# Patient Record
Sex: FEMALE | Race: ASIAN | NOT HISPANIC OR LATINO | URBAN - METROPOLITAN AREA
[De-identification: names, ages, dates, MRNs, and addresses within clinical notes are randomized per-mention and may not be internally consistent; named-entity substitution may affect disease eponyms.]

---

## 2017-03-31 ENCOUNTER — EMERGENCY (EMERGENCY)
Facility: HOSPITAL | Age: 27
LOS: 1 days | Discharge: HOME | End: 2017-03-31
Attending: EMERGENCY MEDICINE | Admitting: EMERGENCY MEDICINE
Payer: COMMERCIAL

## 2017-03-31 VITALS
OXYGEN SATURATION: 100 % | HEART RATE: 85 BPM | TEMPERATURE: 98 F | SYSTOLIC BLOOD PRESSURE: 127 MMHG | DIASTOLIC BLOOD PRESSURE: 81 MMHG | RESPIRATION RATE: 16 BRPM

## 2017-03-31 PROCEDURE — 99283 EMERGENCY DEPT VISIT LOW MDM: CPT

## 2017-03-31 RX ORDER — ACETAMINOPHEN 500 MG
650 TABLET ORAL ONCE
Qty: 0 | Refills: 0 | Status: COMPLETED | OUTPATIENT
Start: 2017-03-31 | End: 2017-03-31

## 2017-03-31 RX ORDER — IBUPROFEN 200 MG
1 TABLET ORAL
Qty: 15 | Refills: 0 | OUTPATIENT
Start: 2017-03-31 | End: 2017-04-05

## 2017-03-31 RX ORDER — CYCLOBENZAPRINE HYDROCHLORIDE 10 MG/1
1 TABLET, FILM COATED ORAL
Qty: 9 | Refills: 0 | OUTPATIENT
Start: 2017-03-31 | End: 2017-04-03

## 2017-03-31 RX ORDER — KETOROLAC TROMETHAMINE 30 MG/ML
30 SYRINGE (ML) INJECTION ONCE
Qty: 0 | Refills: 0 | Status: DISCONTINUED | OUTPATIENT
Start: 2017-03-31 | End: 2017-03-31

## 2017-03-31 RX ADMIN — Medication 650 MILLIGRAM(S): at 12:18

## 2017-03-31 NOTE — ED ADULT TRIAGE NOTE - CHIEF COMPLAINT QUOTE
c/o lower back pain with radiation to B/L thighs on and off x 1 week, patient states she fell 3 weeks ago on ice but never sought treatment.  Patient went to see PMD yesterday and was given referral for MRI, and was prescribed medication but did not  med.  PMH: spontaneous   2016

## 2017-03-31 NOTE — ED PROVIDER NOTE - PROGRESS NOTE DETAILS
REYNALDO Castaneda:  Pt endorses improvement in pain.  Ambulatory without assistance.  Pt medically stable for discharge.  Pt to follow up with PMD and orthopedics (referral list provided).

## 2017-03-31 NOTE — ED PROVIDER NOTE - CHPI ED SYMPTOMS NEG
Denies weakness, difficulty ambulating, h/o similar pain, dysuria, fevers, numbness/tingling, urinary incontinence, and other complaints/no bowel dysfunction/no numbness/no bladder dysfunction/no tingling

## 2017-03-31 NOTE — ED PROVIDER NOTE - PLAN OF CARE
Rest, drink plenty of fluids.  Advance activity as tolerated.  Continue all previously prescribed medications as directed.  Take Motrin 600 mg every 8 hours as needed for moderate pain -- take with food. Take Tylenol 650mg (Two 325 mg pills) every 4-6 hours as needed for pain. Take Flexeril 5 mg every 8 hours as needed for muscle spasm -- causes drowsiness; no drinking alcohol or driving with this medication. Apply warm compress to affected area for 15 minutes, 3-4 times per day. Follow up with your primary care physician and orthopedics (referral list provided) in 48-72 hours- bring copies of your results.  Return to the ER for worsening or persistent symptoms, and/or ANY NEW OR CONCERNING SYMPTOMS. If you have issues obtaining follow up, please call: [phone] to obtain a doctor or specialist who takes your insurance in your area.

## 2017-03-31 NOTE — ED PROVIDER NOTE - CARE PLAN
Principal Discharge DX:	Back pain  Instructions for follow-up, activity and diet:	Rest, drink plenty of fluids.  Advance activity as tolerated.  Continue all previously prescribed medications as directed.  Take Motrin 600 mg every 8 hours as needed for moderate pain -- take with food. Take Tylenol 650mg (Two 325 mg pills) every 4-6 hours as needed for pain. Take Flexeril 5 mg every 8 hours as needed for muscle spasm -- causes drowsiness; no drinking alcohol or driving with this medication. Apply warm compress to affected area for 15 minutes, 3-4 times per day. Follow up with your primary care physician and orthopedics (referral list provided) in 48-72 hours- bring copies of your results.  Return to the ER for worsening or persistent symptoms, and/or ANY NEW OR CONCERNING SYMPTOMS. If you have issues obtaining follow up, please call: [phone] to obtain a doctor or specialist who takes your insurance in your area. Principal Discharge DX:	Back pain  Instructions for follow-up, activity and diet:	Rest, drink plenty of fluids.  Advance activity as tolerated.  Continue all previously prescribed medications as directed.  Take Motrin 600 mg every 8 hours as needed for moderate pain -- take with food. Take Tylenol 650mg (Two 325 mg pills) every 4-6 hours as needed for pain. Take Flexeril 5 mg every 8 hours as needed for muscle spasm -- causes drowsiness; no drinking alcohol or driving with this medication. Apply warm compress to affected area for 15 minutes, 3-4 times per day. Follow up with your primary care physician and orthopedics (referral list provided) in 48-72 hours- bring copies of your results.  Return to the ER for worsening or persistent symptoms, and/or ANY NEW OR CONCERNING SYMPTOMS. If you have issues obtaining follow up, please call: [phone] to obtain a doctor or specialist who takes your insurance in your area.

## 2017-03-31 NOTE — ED PROVIDER NOTE - OBJECTIVE STATEMENT
25 y/o F, w/ no sig PMHx, presents to ED c/o x1wk of low back pain. Reports pain w/ movement. Took Motrin around 6:30AM today. Denies weakness, difficulty ambulating, h/o similar pain, dysuria, fevers, numbness/tingling, urinary incontinence, heavy lifting, and other complaints. Reports a minor fall a few wks ago due to slipping on snow/ice. Pt unsure of pregnancy status.

## 2017-03-31 NOTE — ED PROVIDER NOTE - NS ED MD SCRIBE ATTENDING SCRIBE SECTIONS
VITAL SIGNS( Pullset)/DISPOSITION/HISTORY OF PRESENT ILLNESS/PAST MEDICAL/SURGICAL/SOCIAL HISTORY/REVIEW OF SYSTEMS/HIV/PHYSICAL EXAM

## 2018-01-15 ENCOUNTER — INPATIENT (INPATIENT)
Facility: HOSPITAL | Age: 28
LOS: 2 days | Discharge: HOME | End: 2018-01-18
Attending: OBSTETRICS & GYNECOLOGY | Admitting: OBSTETRICS & GYNECOLOGY
Payer: MEDICAID

## 2018-01-15 ENCOUNTER — TRANSCRIPTION ENCOUNTER (OUTPATIENT)
Age: 28
End: 2018-01-15

## 2018-01-15 ENCOUNTER — EMERGENCY (EMERGENCY)
Facility: HOSPITAL | Age: 28
LOS: 1 days | Discharge: STILL ADMITTED OR ADMINISTRATIVE | End: 2018-01-15
Admitting: EMERGENCY MEDICINE

## 2018-01-15 VITALS
TEMPERATURE: 98 F | SYSTOLIC BLOOD PRESSURE: 129 MMHG | RESPIRATION RATE: 20 BRPM | DIASTOLIC BLOOD PRESSURE: 75 MMHG | HEART RATE: 112 BPM | OXYGEN SATURATION: 100 %

## 2018-01-15 VITALS — HEIGHT: 65 IN | WEIGHT: 198.42 LBS

## 2018-01-15 DIAGNOSIS — O26.899 OTHER SPECIFIED PREGNANCY RELATED CONDITIONS, UNSPECIFIED TRIMESTER: ICD-10-CM

## 2018-01-15 LAB
AMPHET UR-MCNC: NEGATIVE — SIGNIFICANT CHANGE UP
BARBITURATES UR SCN-MCNC: NEGATIVE — SIGNIFICANT CHANGE UP
BASOPHILS # BLD AUTO: 0.03 K/UL — SIGNIFICANT CHANGE UP (ref 0–0.2)
BASOPHILS NFR BLD AUTO: 0.3 % — SIGNIFICANT CHANGE UP (ref 0–2)
BENZODIAZ UR-MCNC: NEGATIVE — SIGNIFICANT CHANGE UP
BLD GP AB SCN SERPL QL: NEGATIVE — SIGNIFICANT CHANGE UP
CANNABINOIDS UR-MCNC: NEGATIVE — SIGNIFICANT CHANGE UP
COCAINE METAB.OTHER UR-MCNC: NEGATIVE — SIGNIFICANT CHANGE UP
EOSINOPHIL # BLD AUTO: 0.06 K/UL — SIGNIFICANT CHANGE UP (ref 0–0.5)
EOSINOPHIL NFR BLD AUTO: 0.5 % — SIGNIFICANT CHANGE UP (ref 0–6)
HCT VFR BLD CALC: 36.8 % — SIGNIFICANT CHANGE UP (ref 34.5–45)
HGB BLD-MCNC: 12.2 G/DL — SIGNIFICANT CHANGE UP (ref 11.5–15.5)
IMM GRANULOCYTES # BLD AUTO: 0.05 # — SIGNIFICANT CHANGE UP
IMM GRANULOCYTES NFR BLD AUTO: 0.5 % — SIGNIFICANT CHANGE UP (ref 0–1.5)
LYMPHOCYTES # BLD AUTO: 1.94 K/UL — SIGNIFICANT CHANGE UP (ref 1–3.3)
LYMPHOCYTES # BLD AUTO: 17.7 % — SIGNIFICANT CHANGE UP (ref 13–44)
MCHC RBC-ENTMCNC: 29.1 PG — SIGNIFICANT CHANGE UP (ref 27–34)
MCHC RBC-ENTMCNC: 33.2 % — SIGNIFICANT CHANGE UP (ref 32–36)
MCV RBC AUTO: 87.8 FL — SIGNIFICANT CHANGE UP (ref 80–100)
METHADONE UR-MCNC: NEGATIVE — SIGNIFICANT CHANGE UP
MONOCYTES # BLD AUTO: 0.59 K/UL — SIGNIFICANT CHANGE UP (ref 0–0.9)
MONOCYTES NFR BLD AUTO: 5.4 % — SIGNIFICANT CHANGE UP (ref 2–14)
NEUTROPHILS # BLD AUTO: 8.3 K/UL — HIGH (ref 1.8–7.4)
NEUTROPHILS NFR BLD AUTO: 75.6 % — SIGNIFICANT CHANGE UP (ref 43–77)
NRBC # FLD: 0 — SIGNIFICANT CHANGE UP
OPIATES UR-MCNC: NEGATIVE — SIGNIFICANT CHANGE UP
OXYCODONE UR-MCNC: NEGATIVE — SIGNIFICANT CHANGE UP
PCP UR-MCNC: NEGATIVE — SIGNIFICANT CHANGE UP
PLATELET # BLD AUTO: 309 K/UL — SIGNIFICANT CHANGE UP (ref 150–400)
PMV BLD: 10.2 FL — SIGNIFICANT CHANGE UP (ref 7–13)
RBC # BLD: 4.19 M/UL — SIGNIFICANT CHANGE UP (ref 3.8–5.2)
RBC # FLD: 13.4 % — SIGNIFICANT CHANGE UP (ref 10.3–14.5)
RH IG SCN BLD-IMP: POSITIVE — SIGNIFICANT CHANGE UP
RH IG SCN BLD-IMP: POSITIVE — SIGNIFICANT CHANGE UP
RUBV IGG SER-ACNC: 0.5 INDEX — SIGNIFICANT CHANGE UP
RUBV IGG SER-IMP: NEGATIVE — SIGNIFICANT CHANGE UP
WBC # BLD: 10.97 K/UL — HIGH (ref 3.8–10.5)
WBC # FLD AUTO: 10.97 K/UL — HIGH (ref 3.8–10.5)

## 2018-01-15 PROCEDURE — 59514 CESAREAN DELIVERY ONLY: CPT | Mod: U9,GC

## 2018-01-15 RX ORDER — OXYCODONE HYDROCHLORIDE 5 MG/1
10 TABLET ORAL
Qty: 0 | Refills: 0 | Status: DISCONTINUED | OUTPATIENT
Start: 2018-01-16 | End: 2018-01-16

## 2018-01-15 RX ORDER — GLYCERIN ADULT
1 SUPPOSITORY, RECTAL RECTAL AT BEDTIME
Qty: 0 | Refills: 0 | Status: DISCONTINUED | OUTPATIENT
Start: 2018-01-15 | End: 2018-01-18

## 2018-01-15 RX ORDER — ONDANSETRON 8 MG/1
4 TABLET, FILM COATED ORAL EVERY 6 HOURS
Qty: 0 | Refills: 0 | Status: DISCONTINUED | OUTPATIENT
Start: 2018-01-15 | End: 2018-01-16

## 2018-01-15 RX ORDER — HYDROMORPHONE HYDROCHLORIDE 2 MG/ML
0.5 INJECTION INTRAMUSCULAR; INTRAVENOUS; SUBCUTANEOUS
Qty: 0 | Refills: 0 | Status: DISCONTINUED | OUTPATIENT
Start: 2018-01-16 | End: 2018-01-16

## 2018-01-15 RX ORDER — NALOXONE HYDROCHLORIDE 4 MG/.1ML
0.1 SPRAY NASAL
Qty: 0 | Refills: 0 | Status: DISCONTINUED | OUTPATIENT
Start: 2018-01-16 | End: 2018-01-16

## 2018-01-15 RX ORDER — DIPHENHYDRAMINE HCL 50 MG
25 CAPSULE ORAL EVERY 4 HOURS
Qty: 0 | Refills: 0 | Status: DISCONTINUED | OUTPATIENT
Start: 2018-01-15 | End: 2018-01-16

## 2018-01-15 RX ORDER — HEPARIN SODIUM 5000 [USP'U]/ML
5000 INJECTION INTRAVENOUS; SUBCUTANEOUS EVERY 12 HOURS
Qty: 0 | Refills: 0 | Status: DISCONTINUED | OUTPATIENT
Start: 2018-01-15 | End: 2018-01-18

## 2018-01-15 RX ORDER — SODIUM CHLORIDE 9 MG/ML
1000 INJECTION, SOLUTION INTRAVENOUS
Qty: 0 | Refills: 0 | Status: DISCONTINUED | OUTPATIENT
Start: 2018-01-15 | End: 2018-01-16

## 2018-01-15 RX ORDER — KETOROLAC TROMETHAMINE 30 MG/ML
30 SYRINGE (ML) INJECTION EVERY 6 HOURS
Qty: 0 | Refills: 0 | Status: DISCONTINUED | OUTPATIENT
Start: 2018-01-15 | End: 2018-01-16

## 2018-01-15 RX ORDER — OXYTOCIN 10 UNIT/ML
333.33 VIAL (ML) INJECTION
Qty: 20 | Refills: 0 | Status: DISCONTINUED | OUTPATIENT
Start: 2018-01-15 | End: 2018-01-15

## 2018-01-15 RX ORDER — FERROUS SULFATE 325(65) MG
325 TABLET ORAL
Qty: 0 | Refills: 0 | Status: DISCONTINUED | OUTPATIENT
Start: 2018-01-15 | End: 2018-01-18

## 2018-01-15 RX ORDER — SODIUM CHLORIDE 9 MG/ML
1000 INJECTION, SOLUTION INTRAVENOUS
Qty: 0 | Refills: 0 | Status: DISCONTINUED | OUTPATIENT
Start: 2018-01-15 | End: 2018-01-15

## 2018-01-15 RX ORDER — HYDROMORPHONE HYDROCHLORIDE 2 MG/ML
1 INJECTION INTRAMUSCULAR; INTRAVENOUS; SUBCUTANEOUS
Qty: 0 | Refills: 0 | Status: DISCONTINUED | OUTPATIENT
Start: 2018-01-15 | End: 2018-01-16

## 2018-01-15 RX ORDER — OXYTOCIN 10 UNIT/ML
333.33 VIAL (ML) INJECTION
Qty: 20 | Refills: 0 | Status: DISCONTINUED | OUTPATIENT
Start: 2018-01-15 | End: 2018-01-16

## 2018-01-15 RX ORDER — OXYCODONE HYDROCHLORIDE 5 MG/1
5 TABLET ORAL EVERY 4 HOURS
Qty: 0 | Refills: 0 | Status: DISCONTINUED | OUTPATIENT
Start: 2018-01-15 | End: 2018-01-16

## 2018-01-15 RX ORDER — SODIUM CHLORIDE 9 MG/ML
1000 INJECTION, SOLUTION INTRAVENOUS ONCE
Qty: 0 | Refills: 0 | Status: COMPLETED | OUTPATIENT
Start: 2018-01-15 | End: 2018-01-15

## 2018-01-15 RX ORDER — DOCUSATE SODIUM 100 MG
100 CAPSULE ORAL
Qty: 0 | Refills: 0 | Status: DISCONTINUED | OUTPATIENT
Start: 2018-01-15 | End: 2018-01-18

## 2018-01-15 RX ORDER — OXYTOCIN 10 UNIT/ML
2 VIAL (ML) INJECTION
Qty: 30 | Refills: 0 | Status: DISCONTINUED | OUTPATIENT
Start: 2018-01-15 | End: 2018-01-15

## 2018-01-15 RX ORDER — OXYTOCIN 10 UNIT/ML
41.67 VIAL (ML) INJECTION
Qty: 20 | Refills: 0 | Status: DISCONTINUED | OUTPATIENT
Start: 2018-01-15 | End: 2018-01-16

## 2018-01-15 RX ORDER — SIMETHICONE 80 MG/1
80 TABLET, CHEWABLE ORAL EVERY 4 HOURS
Qty: 0 | Refills: 0 | Status: DISCONTINUED | OUTPATIENT
Start: 2018-01-15 | End: 2018-01-18

## 2018-01-15 RX ORDER — DEXAMETHASONE 0.5 MG/5ML
4 ELIXIR ORAL EVERY 6 HOURS
Qty: 0 | Refills: 0 | Status: DISCONTINUED | OUTPATIENT
Start: 2018-01-15 | End: 2018-01-16

## 2018-01-15 RX ORDER — IBUPROFEN 200 MG
600 TABLET ORAL EVERY 6 HOURS
Qty: 0 | Refills: 0 | Status: DISCONTINUED | OUTPATIENT
Start: 2018-01-15 | End: 2018-01-16

## 2018-01-15 RX ORDER — DIPHENHYDRAMINE HCL 50 MG
25 CAPSULE ORAL EVERY 6 HOURS
Qty: 0 | Refills: 0 | Status: DISCONTINUED | OUTPATIENT
Start: 2018-01-15 | End: 2018-01-18

## 2018-01-15 RX ORDER — ERTAPENEM SODIUM 1 G/1
1000 INJECTION, POWDER, LYOPHILIZED, FOR SOLUTION INTRAMUSCULAR; INTRAVENOUS ONCE
Qty: 0 | Refills: 0 | Status: COMPLETED | OUTPATIENT
Start: 2018-01-15 | End: 2018-01-15

## 2018-01-15 RX ORDER — INFLUENZA VIRUS VACCINE 15; 15; 15; 15 UG/.5ML; UG/.5ML; UG/.5ML; UG/.5ML
0.5 SUSPENSION INTRAMUSCULAR ONCE
Qty: 0 | Refills: 0 | Status: COMPLETED | OUTPATIENT
Start: 2018-01-15 | End: 2018-01-15

## 2018-01-15 RX ORDER — OXYCODONE HYDROCHLORIDE 5 MG/1
5 TABLET ORAL
Qty: 0 | Refills: 0 | Status: DISCONTINUED | OUTPATIENT
Start: 2018-01-16 | End: 2018-01-16

## 2018-01-15 RX ORDER — TETANUS TOXOID, REDUCED DIPHTHERIA TOXOID AND ACELLULAR PERTUSSIS VACCINE, ADSORBED 5; 2.5; 8; 8; 2.5 [IU]/.5ML; [IU]/.5ML; UG/.5ML; UG/.5ML; UG/.5ML
0.5 SUSPENSION INTRAMUSCULAR ONCE
Qty: 0 | Refills: 0 | Status: DISCONTINUED | OUTPATIENT
Start: 2018-01-15 | End: 2018-01-18

## 2018-01-15 RX ORDER — OXYCODONE HYDROCHLORIDE 5 MG/1
5 TABLET ORAL
Qty: 0 | Refills: 0 | Status: DISCONTINUED | OUTPATIENT
Start: 2018-01-15 | End: 2018-01-16

## 2018-01-15 RX ORDER — LANOLIN
1 OINTMENT (GRAM) TOPICAL
Qty: 0 | Refills: 0 | Status: DISCONTINUED | OUTPATIENT
Start: 2018-01-15 | End: 2018-01-18

## 2018-01-15 RX ORDER — ACETAMINOPHEN 500 MG
975 TABLET ORAL EVERY 6 HOURS
Qty: 0 | Refills: 0 | Status: DISCONTINUED | OUTPATIENT
Start: 2018-01-15 | End: 2018-01-18

## 2018-01-15 RX ADMIN — ERTAPENEM SODIUM 120 MILLIGRAM(S): 1 INJECTION, POWDER, LYOPHILIZED, FOR SOLUTION INTRAMUSCULAR; INTRAVENOUS at 19:05

## 2018-01-15 RX ADMIN — Medication 30 MILLIGRAM(S): at 20:30

## 2018-01-15 RX ADMIN — Medication 2 MILLIUNIT(S)/MIN: at 12:44

## 2018-01-15 RX ADMIN — Medication 125 MILLIUNIT(S)/MIN: at 22:00

## 2018-01-15 RX ADMIN — Medication 30 MILLIGRAM(S): at 21:00

## 2018-01-15 RX ADMIN — SODIUM CHLORIDE 2000 MILLILITER(S): 9 INJECTION, SOLUTION INTRAVENOUS at 09:28

## 2018-01-15 RX ADMIN — Medication 100 MILLIGRAM(S): at 20:00

## 2018-01-15 RX ADMIN — Medication 125 MILLIUNIT(S)/MIN: at 15:31

## 2018-01-15 RX ADMIN — SODIUM CHLORIDE 75 MILLILITER(S): 9 INJECTION, SOLUTION INTRAVENOUS at 15:31

## 2018-01-15 RX ADMIN — HEPARIN SODIUM 5000 UNIT(S): 5000 INJECTION INTRAVENOUS; SUBCUTANEOUS at 21:00

## 2018-01-15 RX ADMIN — SODIUM CHLORIDE 125 MILLILITER(S): 9 INJECTION, SOLUTION INTRAVENOUS at 11:01

## 2018-01-15 RX ADMIN — ONDANSETRON 4 MILLIGRAM(S): 8 TABLET, FILM COATED ORAL at 19:00

## 2018-01-15 NOTE — ED ADULT TRIAGE NOTE - CHIEF COMPLAINT QUOTE
Pt c/o contractions that started around 4am, 4-5 minutes apart. Denies ROM. GAVI 1/13, pt of Dr. Nesbitt. L&D called, pt ok to go upstairs.

## 2018-01-16 ENCOUNTER — TRANSCRIPTION ENCOUNTER (OUTPATIENT)
Age: 28
End: 2018-01-16

## 2018-01-16 LAB
BASOPHILS # BLD AUTO: 0.02 K/UL — SIGNIFICANT CHANGE UP (ref 0–0.2)
BASOPHILS NFR BLD AUTO: 0.2 % — SIGNIFICANT CHANGE UP (ref 0–2)
EOSINOPHIL # BLD AUTO: 0.04 K/UL — SIGNIFICANT CHANGE UP (ref 0–0.5)
EOSINOPHIL NFR BLD AUTO: 0.3 % — SIGNIFICANT CHANGE UP (ref 0–6)
HCT VFR BLD CALC: 29.8 % — LOW (ref 34.5–45)
HGB BLD-MCNC: 10 G/DL — LOW (ref 11.5–15.5)
IMM GRANULOCYTES # BLD AUTO: 0.05 # — SIGNIFICANT CHANGE UP
IMM GRANULOCYTES NFR BLD AUTO: 0.4 % — SIGNIFICANT CHANGE UP (ref 0–1.5)
LYMPHOCYTES # BLD AUTO: 1.99 K/UL — SIGNIFICANT CHANGE UP (ref 1–3.3)
LYMPHOCYTES # BLD AUTO: 16.3 % — SIGNIFICANT CHANGE UP (ref 13–44)
MCHC RBC-ENTMCNC: 29.6 PG — SIGNIFICANT CHANGE UP (ref 27–34)
MCHC RBC-ENTMCNC: 33.6 % — SIGNIFICANT CHANGE UP (ref 32–36)
MCV RBC AUTO: 88.2 FL — SIGNIFICANT CHANGE UP (ref 80–100)
MONOCYTES # BLD AUTO: 0.64 K/UL — SIGNIFICANT CHANGE UP (ref 0–0.9)
MONOCYTES NFR BLD AUTO: 5.2 % — SIGNIFICANT CHANGE UP (ref 2–14)
NEUTROPHILS # BLD AUTO: 9.47 K/UL — HIGH (ref 1.8–7.4)
NEUTROPHILS NFR BLD AUTO: 77.6 % — HIGH (ref 43–77)
NRBC # FLD: 0 — SIGNIFICANT CHANGE UP
PLATELET # BLD AUTO: 236 K/UL — SIGNIFICANT CHANGE UP (ref 150–400)
PMV BLD: 10.4 FL — SIGNIFICANT CHANGE UP (ref 7–13)
RBC # BLD: 3.38 M/UL — LOW (ref 3.8–5.2)
RBC # FLD: 13.3 % — SIGNIFICANT CHANGE UP (ref 10.3–14.5)
WBC # BLD: 12.21 K/UL — HIGH (ref 3.8–10.5)
WBC # FLD AUTO: 12.21 K/UL — HIGH (ref 3.8–10.5)

## 2018-01-16 RX ORDER — OXYCODONE HYDROCHLORIDE 5 MG/1
5 TABLET ORAL EVERY 4 HOURS
Qty: 0 | Refills: 0 | Status: DISCONTINUED | OUTPATIENT
Start: 2018-01-16 | End: 2018-01-18

## 2018-01-16 RX ORDER — OXYCODONE HYDROCHLORIDE 5 MG/1
5 TABLET ORAL EVERY 4 HOURS
Qty: 0 | Refills: 0 | Status: COMPLETED | OUTPATIENT
Start: 2018-01-16 | End: 2018-01-23

## 2018-01-16 RX ORDER — ACETAMINOPHEN 500 MG
3 TABLET ORAL
Qty: 0 | Refills: 0 | COMMUNITY
Start: 2018-01-16

## 2018-01-16 RX ORDER — IBUPROFEN 200 MG
600 TABLET ORAL EVERY 6 HOURS
Qty: 0 | Refills: 0 | Status: COMPLETED | OUTPATIENT
Start: 2018-01-16 | End: 2018-12-15

## 2018-01-16 RX ORDER — IBUPROFEN 200 MG
1 TABLET ORAL
Qty: 0 | Refills: 0 | COMMUNITY
Start: 2018-01-16

## 2018-01-16 RX ORDER — OXYCODONE HYDROCHLORIDE 5 MG/1
1 TABLET ORAL
Qty: 20 | Refills: 0 | OUTPATIENT
Start: 2018-01-16 | End: 2018-01-20

## 2018-01-16 RX ORDER — IBUPROFEN 200 MG
600 TABLET ORAL EVERY 6 HOURS
Qty: 0 | Refills: 0 | Status: DISCONTINUED | OUTPATIENT
Start: 2018-01-16 | End: 2018-01-18

## 2018-01-16 RX ORDER — OXYCODONE HYDROCHLORIDE 5 MG/1
5 TABLET ORAL
Qty: 0 | Refills: 0 | Status: COMPLETED | OUTPATIENT
Start: 2018-01-16 | End: 2018-01-23

## 2018-01-16 RX ORDER — OXYCODONE HYDROCHLORIDE 5 MG/1
5 TABLET ORAL
Qty: 0 | Refills: 0 | Status: DISCONTINUED | OUTPATIENT
Start: 2018-01-16 | End: 2018-01-18

## 2018-01-16 RX ADMIN — Medication 30 MILLIGRAM(S): at 02:30

## 2018-01-16 RX ADMIN — Medication 325 MILLIGRAM(S): at 08:59

## 2018-01-16 RX ADMIN — Medication 100 MILLIGRAM(S): at 21:30

## 2018-01-16 RX ADMIN — HEPARIN SODIUM 5000 UNIT(S): 5000 INJECTION INTRAVENOUS; SUBCUTANEOUS at 08:59

## 2018-01-16 RX ADMIN — Medication 100 MILLIGRAM(S): at 08:59

## 2018-01-16 RX ADMIN — Medication 600 MILLIGRAM(S): at 15:20

## 2018-01-16 RX ADMIN — Medication 975 MILLIGRAM(S): at 14:23

## 2018-01-16 RX ADMIN — HEPARIN SODIUM 5000 UNIT(S): 5000 INJECTION INTRAVENOUS; SUBCUTANEOUS at 21:00

## 2018-01-16 RX ADMIN — Medication 30 MILLIGRAM(S): at 08:59

## 2018-01-16 RX ADMIN — Medication 325 MILLIGRAM(S): at 21:30

## 2018-01-16 RX ADMIN — Medication 600 MILLIGRAM(S): at 14:22

## 2018-01-16 RX ADMIN — SODIUM CHLORIDE 125 MILLILITER(S): 9 INJECTION, SOLUTION INTRAVENOUS at 07:00

## 2018-01-16 RX ADMIN — Medication 30 MILLIGRAM(S): at 09:15

## 2018-01-16 RX ADMIN — Medication 30 MILLIGRAM(S): at 03:00

## 2018-01-16 NOTE — DISCHARGE NOTE OB - HOSPITAL COURSE
Patient underwent an uncomplicated primary LTCS for NRFHT (please see operative note for details of procedure). , hct 36.8. Patient's postoperative course was unremarkable and she remained hemodynamically stable and afebrile throughout. Upon discharge on POD3, the patient was ambulating, voiding spontaneously, tolerating PO intake, pain was well controlled with oral medications, and vital signs were stable.

## 2018-01-16 NOTE — DISCHARGE NOTE OB - ADDITIONAL INSTRUCTIONS
As per pt, she will return to [facility] for OB f/u, Dr Richard [phone]

## 2018-01-16 NOTE — DISCHARGE NOTE OB - MEDICATION SUMMARY - MEDICATIONS TO STOP TAKING
I will STOP taking the medications listed below when I get home from the hospital:     mg oral tablet  -- 1 tab(s) by mouth every 8 hours, As Needed - for moderate pain  -- Do not take this drug if you are pregnant.  It is very important that you take or use this exactly as directed.  Do not skip doses or discontinue unless directed by your doctor.  May cause drowsiness or dizziness.  Obtain medical advice before taking any non-prescription drugs as some may affect the action of this medication.  Take with food or milk.    cyclobenzaprine 5 mg oral tablet  -- 1 tab(s) by mouth every 8 hours, As Needed - for muscle spasm  -- Some non-prescription drugs may aggravate your condition.  Read all labels carefully.  If a warning appears, check with your doctor before taking.  This drug may impair the ability to drive or operate machinery.  Use care until you become familiar with its effects.

## 2018-01-16 NOTE — DISCHARGE NOTE OB - CARE PLAN
Principal Discharge DX:	 delivery delivered  Goal:	Recovery to baseline function and activity  Instructions for follow-up, activity and diet:	Make your follow-up appointment with your doctor as ordered. No heavy lifting, driving, or strenous activity for 6 weeks. Nothing per vagina such as tampons, intercourse, douches or tub baths for 6 weeks or until you see your doctor. Call your doctor with any signs and symptoms if infection such as fever, chills, nausea or vomiting. Call your doctor with redness or swelling at the incision site, fluid leakage or wound separation. Call your doctor if you're unable to tolerate food, increase in vaginal bleeding, or have difficulty urinating. Call your doctor if you have pain that is not relieved by your prescribed medications. Notify your doctor with any other concerns.  Call [phone] if you have any of these concerns in the next 6 weeks. Principal Discharge DX:	 delivery delivered  Goal:	Recovery to baseline function and activity  Assessment and plan of treatment:	Make your follow-up appointment with your doctor as ordered. No heavy lifting, driving, or strenous activity for 6 weeks. Nothing per vagina such as tampons, intercourse, douches or tub baths for 6 weeks or until you see your doctor. Call your doctor with any signs and symptoms if infection such as fever, chills, nausea or vomiting. Call your doctor with redness or swelling at the incision site, fluid leakage or wound separation. Call your doctor if you're unable to tolerate food, increase in vaginal bleeding, or have difficulty urinating. Call your doctor if you have pain that is not relieved by your prescribed medications. Notify your doctor with any other concerns.  Call [phone] if you have any of these concerns in the next 6 weeks.

## 2018-01-16 NOTE — DISCHARGE NOTE OB - PLAN OF CARE
Recovery to baseline function and activity Make your follow-up appointment with your doctor as ordered. No heavy lifting, driving, or strenous activity for 6 weeks. Nothing per vagina such as tampons, intercourse, douches or tub baths for 6 weeks or until you see your doctor. Call your doctor with any signs and symptoms if infection such as fever, chills, nausea or vomiting. Call your doctor with redness or swelling at the incision site, fluid leakage or wound separation. Call your doctor if you're unable to tolerate food, increase in vaginal bleeding, or have difficulty urinating. Call your doctor if you have pain that is not relieved by your prescribed medications. Notify your doctor with any other concerns.  Call [phone] if you have any of these concerns in the next 6 weeks.

## 2018-01-16 NOTE — DISCHARGE NOTE OB - CARE PROVIDER_API CALL
Zoe Pepe), Obstetrics and Gynecology  [address] [place], [place] [address]  [place] [address]  Phone: [phone]  Fax: [phone]

## 2018-01-16 NOTE — DISCHARGE NOTE OB - PATIENT PORTAL LINK FT
“You can access the Follow[employer] Patient Portal, offered by [facility], by registering with the following website: [email or web address]”

## 2018-01-16 NOTE — DISCHARGE NOTE OB - MEDICATION SUMMARY - MEDICATIONS TO TAKE
I will START or STAY ON the medications listed below when I get home from the hospital:    acetaminophen 325 mg oral tablet  -- 3 tab(s) by mouth every 6 hours  -- Indication: For for pain/cramping    ibuprofen 600 mg oral tablet  -- 1 tab(s) by mouth every 6 hours  -- Indication: For for pain/cramping    oxyCODONE 5 mg oral capsule  -- 1 cap(s) by mouth every 6 hours MDD:4  -- Caution federal law prohibits the transfer of this drug to any person other  than the person for whom it was prescribed.  It is very important that you take or use this exactly as directed.  Do not skip doses or discontinue unless directed by your doctor.  May cause drowsiness.  Alcohol may intensify this effect.  Use care when operating dangerous machinery.  This prescription cannot be refilled.  Using more of this medication than prescribed may cause serious breathing problems.    -- Indication: For for severe pain    Prenatal Multivitamins with Folic Acid 1 mg oral tablet  -- 1 tab(s) by mouth once a day  -- Indication: For home med I will START or STAY ON the medications listed below when I get home from the hospital:    acetaminophen 325 mg oral tablet  -- 3 tab(s) by mouth every 6 hours  -- Indication: For for pain/cramping    ibuprofen 600 mg oral tablet  -- 1 tab(s) by mouth every 6 hours  -- Indication: For for pain/cramping    oxyCODONE 5 mg oral capsule  -- 1 cap(s) by mouth every 6 hours MDD:4  -- Caution federal law prohibits the transfer of this drug to any person other  than the person for whom it was prescribed.  It is very important that you take or use this exactly as directed.  Do not skip doses or discontinue unless directed by your doctor.  May cause drowsiness.  Alcohol may intensify this effect.  Use care when operating dangerous machinery.  This prescription cannot be refilled.  Using more of this medication than prescribed may cause serious breathing problems.    -- Indication: For for severe pain    Prenatal Multivitamins with Folic Acid 1 mg oral tablet  -- 1 tab(s) by mouth once a day  -- Indication: For home med    norethindrone 0.35 mg oral tablet  -- 1 tab(s) by mouth once a day MDD:1  -- Do not take this drug if you are pregnant.  It is very important that you take or use this exactly as directed.  Do not skip doses or discontinue unless directed by your doctor.    -- Indication: For birth control

## 2018-01-16 NOTE — DISCHARGE NOTE OB - MATERIALS PROVIDED
[facility] Dime Box Screening Program/Dime Box  Immunization Record/[facility] Hearing Screen Program/Birth Certificate Instructions/Breastfeeding Log/Back To Sleep Handout/Shaken Baby Prevention Handout/Breastfeeding Mother’s Support Group Information/Guide to Postpartum Care/Vaccinations/Breastfeeding Guide and Packet

## 2018-01-17 LAB
RPR SERPL-ACNC: NONREACTIVE — SIGNIFICANT CHANGE UP
T PALLIDUM AB TITR SER: POSITIVE — SIGNIFICANT CHANGE UP
T PALLIDUM IGG SER QL IF: SIGNIFICANT CHANGE UP

## 2018-01-17 RX ADMIN — Medication 600 MILLIGRAM(S): at 17:25

## 2018-01-17 RX ADMIN — HEPARIN SODIUM 5000 UNIT(S): 5000 INJECTION INTRAVENOUS; SUBCUTANEOUS at 11:56

## 2018-01-17 RX ADMIN — SIMETHICONE 80 MILLIGRAM(S): 80 TABLET, CHEWABLE ORAL at 11:57

## 2018-01-17 RX ADMIN — Medication 600 MILLIGRAM(S): at 00:40

## 2018-01-17 RX ADMIN — Medication 100 MILLIGRAM(S): at 12:00

## 2018-01-17 RX ADMIN — Medication 975 MILLIGRAM(S): at 17:25

## 2018-01-17 RX ADMIN — Medication 600 MILLIGRAM(S): at 06:50

## 2018-01-17 RX ADMIN — Medication 600 MILLIGRAM(S): at 11:57

## 2018-01-17 RX ADMIN — Medication 600 MILLIGRAM(S): at 06:20

## 2018-01-17 RX ADMIN — Medication 600 MILLIGRAM(S): at 00:10

## 2018-01-17 RX ADMIN — Medication 1 TABLET(S): at 11:56

## 2018-01-17 RX ADMIN — Medication 600 MILLIGRAM(S): at 23:55

## 2018-01-17 RX ADMIN — Medication 325 MILLIGRAM(S): at 17:25

## 2018-01-17 RX ADMIN — SIMETHICONE 80 MILLIGRAM(S): 80 TABLET, CHEWABLE ORAL at 17:25

## 2018-01-17 RX ADMIN — Medication 600 MILLIGRAM(S): at 18:25

## 2018-01-17 RX ADMIN — Medication 975 MILLIGRAM(S): at 11:56

## 2018-01-17 RX ADMIN — Medication 975 MILLIGRAM(S): at 23:55

## 2018-01-17 RX ADMIN — Medication 975 MILLIGRAM(S): at 06:21

## 2018-01-17 RX ADMIN — Medication 325 MILLIGRAM(S): at 12:00

## 2018-01-17 RX ADMIN — Medication 975 MILLIGRAM(S): at 00:10

## 2018-01-17 RX ADMIN — Medication 600 MILLIGRAM(S): at 12:38

## 2018-01-18 VITALS
HEART RATE: 78 BPM | TEMPERATURE: 98 F | DIASTOLIC BLOOD PRESSURE: 57 MMHG | OXYGEN SATURATION: 100 % | SYSTOLIC BLOOD PRESSURE: 118 MMHG

## 2018-01-18 RX ORDER — NORETHINDRONE 0.35 MG/1
1 TABLET ORAL
Qty: 28 | Refills: 3 | OUTPATIENT
Start: 2018-01-18 | End: 2018-05-09

## 2018-01-18 RX ORDER — NORETHINDRONE AND ETHINYL ESTRADIOL 0.4-0.035
1 KIT ORAL
Qty: 28 | Refills: 3 | OUTPATIENT
Start: 2018-01-18 | End: 2018-05-09

## 2018-01-18 RX ADMIN — Medication 600 MILLIGRAM(S): at 00:30

## 2018-01-18 RX ADMIN — Medication 600 MILLIGRAM(S): at 06:08

## 2018-01-18 RX ADMIN — Medication 600 MILLIGRAM(S): at 06:40

## 2018-01-18 RX ADMIN — OXYCODONE HYDROCHLORIDE 5 MILLIGRAM(S): 5 TABLET ORAL at 16:57

## 2018-01-18 RX ADMIN — HEPARIN SODIUM 5000 UNIT(S): 5000 INJECTION INTRAVENOUS; SUBCUTANEOUS at 00:00

## 2018-01-18 RX ADMIN — Medication 975 MILLIGRAM(S): at 06:09

## 2018-01-18 RX ADMIN — Medication 600 MILLIGRAM(S): at 11:37

## 2018-01-18 RX ADMIN — Medication 100 MILLIGRAM(S): at 06:08

## 2018-01-18 RX ADMIN — Medication 600 MILLIGRAM(S): at 12:15

## 2018-01-18 RX ADMIN — SIMETHICONE 80 MILLIGRAM(S): 80 TABLET, CHEWABLE ORAL at 06:08

## 2018-01-18 RX ADMIN — Medication 975 MILLIGRAM(S): at 11:37

## 2018-01-18 RX ADMIN — OXYCODONE HYDROCHLORIDE 5 MILLIGRAM(S): 5 TABLET ORAL at 16:30

## 2018-01-18 RX ADMIN — Medication 325 MILLIGRAM(S): at 11:37

## 2018-01-18 NOTE — PROGRESS NOTE ADULT - PROBLEM SELECTOR PLAN 1
- increase out of bed and ambulation  - analgesia prn  - PO pain meds w tylenol/motrin/oxycodone  - continue regular diet  - discharge planned for today    DOUGIE Crain, PGY1
- increase out of bed and ambulation  - analgesia PRN  - continue regular diet    DOUGIE Crain, PGY1
- increase out of bed and ambulation  - analgesia prn  - continue regular diet  - check CBC  - D/C le (UOP adequate)  - incentive spirometry    DOUGIE Crain, PGY1

## 2018-01-18 NOTE — PROGRESS NOTE ADULT - ASSESSMENT
27y old s/p C/S. Pt stable.  POD # 3
27y old s/p C/S. Pt stable.  POD # 1
27y old s/p C/S. Pt stable.  POD # 1

## 2018-01-18 NOTE — PROGRESS NOTE ADULT - SUBJECTIVE AND OBJECTIVE BOX
Postpartum Note,  Section  She is a  27y woman who is now post-operative day: 3    Subjective:  The patient feels well, no acute complaints.  Ambulating, tolerating PO diet, denies nausea/vomiting.  Voiding spontaneously, and passing gas.  Pain is controlled. Reports normal postpartum bleeding.      Physical exam:    Vital Signs Last 24 Hrs  T(C): 37.1 (2018 05:42), Max: 37.1 (2018 05:42)  T(F): 98.7 (2018 05:42), Max: 98.7 (2018 05:42)  HR: 85 (2018 05:42) (85 - 99)  BP: 106/63 (2018 05:42) (102/57 - 118/61)  BP(mean): --  RR: 18 (2018 05:42) (18 - 18)  SpO2: 99% (2018 05:42) (98% - 100%)    Gen: NAD  Abdomen: Soft, nontender, no distension , firm uterine fundus at umbilicus.  Incision: Clean, dry, and intact with steri strips  Pelvic: Normal lochia noted  Ext: No calf tenderness    LABS:                  Allergies    No Known Allergies    Intolerances      MEDICATIONS  (STANDING):  acetaminophen   Tablet 975 milliGRAM(s) Oral every 6 hours  diphtheria/tetanus/pertussis (acellular) Vaccine (ADAcel) 0.5 milliLiter(s) IntraMuscular once  docusate sodium 100 milliGRAM(s) Oral two times a day  ferrous    sulfate 325 milliGRAM(s) Oral two times a day with meals  heparin  Injectable 5000 Unit(s) SubCutaneous every 12 hours  ibuprofen  Tablet 600 milliGRAM(s) Oral every 6 hours  influenza   Vaccine 0.5 milliLiter(s) IntraMuscular once  measles/mumps/rubella Vaccine 0.5 milliLiter(s) SubCutaneous once  oxyCODONE    IR 5 milliGRAM(s) Oral every 3 hours  prenatal multivitamin 1 Tablet(s) Oral daily    MEDICATIONS  (PRN):  diphenhydrAMINE   Capsule 25 milliGRAM(s) Oral every 6 hours PRN Itching  glycerin Suppository - Adult 1 Suppository(s) Rectal at bedtime PRN Constipation  lanolin Ointment 1 Application(s) Topical every 3 hours PRN Sore Nipples  oxyCODONE    IR 5 milliGRAM(s) Oral every 4 hours PRN Severe Pain (7 - 10)  simethicone 80 milliGRAM(s) Chew every 4 hours PRN Gas
Pain Service Follow-up  Postop Day  1    S/P  C- Section    T(C): 36.7 (01-16-18 @ 05:50), Max: 36.9 (01-16-18 @ 01:28)  HR: 76 (01-16-18 @ 05:50) (76 - 123)  BP: 100/50 (01-16-18 @ 05:50) (93/65 - 116/65)  RR: 18 (01-16-18 @ 05:50) (14 - 31)  SpO2: 99% (01-16-18 @ 05:50) (99% - 100%)  Wt(kg): --      THERAPY:  [  ] Spinal morphine   [X] Epidural morphine   [  ] IV PCA Hydromorphone 1 mg/ml    Sedation Score:	  [X] Alert	      [  ] Drowsy       [  ] Arousable	[  ] Asleep         [  ] Unresponsive    Side Effects:	  [X] None	      [  ] Nausea       [  ] Pruritus        [  ] Weakness   [  ] Numbness        ASSESSMENT/ PLAN   [ X ] Discontinue         [  ] Continue    [ X ] Documentation and Verification of current medications       Satisfactory Post Anesthetic Course
Postpartum Note,  Section  She is a  27y woman who is now post-operative day: 1    Subjective:  The patient feels well, no acute complaints.  Ambulating, tolerating PO diet, denies nausea/vomiting.  Mckoy in place, draining well, and passing gas.  Pain is controlled. Reports normal postpartum bleeding.    Physical exam:    Vital Signs Last 24 Hrs  T(C): 36.7 (2018 05:50), Max: 36.9 (2018 01:28)  T(F): 98 (2018 05:50), Max: 98.4 (2018 01:28)  HR: 76 (2018 05:50) (76 - 123)  BP: 100/50 (2018 05:50) (93/65 - 129/75)  BP(mean): 66 (15 Jarrod 2018 16:15) (61 - 80)  RR: 18 (2018 05:50) (14 - 31)  SpO2: 99% (2018 05:50) (99% - 100%)    Gen: NAD  Abdomen: Soft, nontender, no distension , firm uterine fundus at umbilicus.  Incision: Clean, dry, and intact with steri strips  Pelvic: Normal lochia noted  Ext: No calf tenderness    LABS:                        10.0   12.21 )-----------( 236      ( 2018 06:00 )             29.8                         12.2   10.97 )-----------( 309      ( 15 Jarrod 2018 09:20 )             36.8     ABO Interpretation: B (01-15 @ 09:26)  Rh Interpretation: Positive (01-15 @ 09:26)  Rubella IgG Interp: Negative (01-15 @ 09:20)  ABO Interpretation: B (01-15 @ 08:53)  Rh Interpretation: Positive (01-15 @ 08:53)  Antibody Screen: Negative (01-15 @ 08:53)                Allergies    No Known Allergies    Intolerances      MEDICATIONS  (STANDING):  acetaminophen   Tablet 975 milliGRAM(s) Oral every 6 hours  diphtheria/tetanus/pertussis (acellular) Vaccine (ADAcel) 0.5 milliLiter(s) IntraMuscular once  docusate sodium 100 milliGRAM(s) Oral two times a day  ferrous    sulfate 325 milliGRAM(s) Oral two times a day with meals  heparin  Injectable 5000 Unit(s) SubCutaneous every 12 hours  ibuprofen  Tablet 600 milliGRAM(s) Oral every 6 hours  influenza   Vaccine 0.5 milliLiter(s) IntraMuscular once  ketorolac   Injectable 30 milliGRAM(s) IV Push every 6 hours  lactated ringers. 1000 milliLiter(s) (125 mL/Hr) IV Continuous <Continuous>  oxyCODONE    IR 5 milliGRAM(s) Oral every 3 hours  prenatal multivitamin 1 Tablet(s) Oral daily    MEDICATIONS  (PRN):  dexamethasone  Injectable 4 milliGRAM(s) IV Push every 6 hours PRN Nausea, IF ondansetron is ineffective after 30 - 60 minutes  diphenhydrAMINE   Capsule 25 milliGRAM(s) Oral every 6 hours PRN Itching  diphenhydrAMINE   Injectable 25 milliGRAM(s) IV Push every 4 hours PRN Pruritus  glycerin Suppository - Adult 1 Suppository(s) Rectal at bedtime PRN Constipation  HYDROmorphone  Injectable 1 milliGRAM(s) IV Push every 3 hours PRN Severe Pain  HYDROmorphone  Injectable 0.5 milliGRAM(s) IV Push every 3 hours PRN Moderate Pain  lanolin Ointment 1 Application(s) Topical every 3 hours PRN Sore Nipples  naloxone Injectable 0.1 milliGRAM(s) IV Push every 3 minutes PRN For ANY of the following changes in patient status:  A. RR LESS THAN 10 breaths per minute, B. Oxygen saturation LESS THAN 90%, C. Sedation score of 6  ondansetron Injectable 4 milliGRAM(s) IV Push every 6 hours PRN Nausea  oxyCODONE    IR 5 milliGRAM(s) Oral every 3 hours PRN Mild Pain  oxyCODONE    IR 10 milliGRAM(s) Oral every 3 hours PRN Moderate Pain  oxyCODONE    IR 5 milliGRAM(s) Oral every 4 hours PRN Severe Pain (7 - 10)  simethicone 80 milliGRAM(s) Chew every 4 hours PRN Gas
Postpartum Note,  Section  She is a  27y woman who is now post-operative day: 2    Subjective:  The patient feels well, no acute complaints.  Ambulating, tolerating PO diet, denies nausea/vomiting.  Voiding spontaneously and passing gas.  Pain is controlled. Reports normal postpartum bleeding.      Physical exam:    Vital Signs Last 24 Hrs  T(C): 36.9 (2018 06:04), Max: 36.9 (2018 06:04)  T(F): 98.4 (2018 06:04), Max: 98.4 (2018 06:04)  HR: 72 (2018 06:04) (70 - 78)  BP: 106/54 (2018 06:04) (100/46 - 107/56)  BP(mean): --  RR: 18 (2018 06:04) (18 - 18)  SpO2: 99% (2018 06:04) (99% - 100%)    Gen: NAD  Abdomen: Soft, nontender, no distension , firm uterine fundus at umbilicus.  Incision: Clean, dry, and intact with steri strips  Pelvic: Normal lochia noted  Ext: No calf tenderness    LABS:                        10.0   12.21 )-----------( 236      ( 2018 06:00 )             29.8                         12.2   10.97 )-----------( 309      ( 15 Jarrod 2018 09:20 )             36.8                   Allergies    No Known Allergies    Intolerances      MEDICATIONS  (STANDING):  acetaminophen   Tablet 975 milliGRAM(s) Oral every 6 hours  diphtheria/tetanus/pertussis (acellular) Vaccine (ADAcel) 0.5 milliLiter(s) IntraMuscular once  docusate sodium 100 milliGRAM(s) Oral two times a day  ferrous    sulfate 325 milliGRAM(s) Oral two times a day with meals  heparin  Injectable 5000 Unit(s) SubCutaneous every 12 hours  ibuprofen  Tablet 600 milliGRAM(s) Oral every 6 hours  influenza   Vaccine 0.5 milliLiter(s) IntraMuscular once  oxyCODONE    IR 5 milliGRAM(s) Oral every 3 hours  prenatal multivitamin 1 Tablet(s) Oral daily    MEDICATIONS  (PRN):  diphenhydrAMINE   Capsule 25 milliGRAM(s) Oral every 6 hours PRN Itching  glycerin Suppository - Adult 1 Suppository(s) Rectal at bedtime PRN Constipation  lanolin Ointment 1 Application(s) Topical every 3 hours PRN Sore Nipples  oxyCODONE    IR 5 milliGRAM(s) Oral every 4 hours PRN Severe Pain (7 - 10)  simethicone 80 milliGRAM(s) Chew every 4 hours PRN Gas

## 2018-01-23 ENCOUNTER — EMERGENCY (EMERGENCY)
Facility: HOSPITAL | Age: 28
LOS: 1 days | Discharge: HOME | End: 2018-01-23
Attending: EMERGENCY MEDICINE | Admitting: EMERGENCY MEDICINE
Payer: MEDICAID

## 2018-01-23 VITALS
OXYGEN SATURATION: 100 % | RESPIRATION RATE: 18 BRPM | DIASTOLIC BLOOD PRESSURE: 72 MMHG | TEMPERATURE: 99 F | SYSTOLIC BLOOD PRESSURE: 110 MMHG | HEART RATE: 85 BPM

## 2018-01-23 VITALS
DIASTOLIC BLOOD PRESSURE: 67 MMHG | HEART RATE: 99 BPM | TEMPERATURE: 98 F | RESPIRATION RATE: 18 BRPM | SYSTOLIC BLOOD PRESSURE: 108 MMHG

## 2018-01-23 DIAGNOSIS — L03.90 CELLULITIS, UNSPECIFIED: ICD-10-CM

## 2018-01-23 LAB
ALBUMIN SERPL ELPH-MCNC: 3.4 G/DL — SIGNIFICANT CHANGE UP (ref 3.3–5)
ALP SERPL-CCNC: 148 U/L — HIGH (ref 40–120)
ALT FLD-CCNC: 19 U/L — SIGNIFICANT CHANGE UP (ref 4–33)
APPEARANCE UR: SIGNIFICANT CHANGE UP
APTT BLD: 30.3 SEC — SIGNIFICANT CHANGE UP (ref 27.5–37.4)
AST SERPL-CCNC: 18 U/L — SIGNIFICANT CHANGE UP (ref 4–32)
BASOPHILS # BLD AUTO: 0.03 K/UL — SIGNIFICANT CHANGE UP (ref 0–0.2)
BASOPHILS NFR BLD AUTO: 0.2 % — SIGNIFICANT CHANGE UP (ref 0–2)
BILIRUB SERPL-MCNC: 0.2 MG/DL — SIGNIFICANT CHANGE UP (ref 0.2–1.2)
BILIRUB UR-MCNC: NEGATIVE — SIGNIFICANT CHANGE UP
BLOOD UR QL VISUAL: HIGH
BUN SERPL-MCNC: 13 MG/DL — SIGNIFICANT CHANGE UP (ref 7–23)
CALCIUM SERPL-MCNC: 8.8 MG/DL — SIGNIFICANT CHANGE UP (ref 8.4–10.5)
CHLORIDE SERPL-SCNC: 105 MMOL/L — SIGNIFICANT CHANGE UP (ref 98–107)
CO2 SERPL-SCNC: 23 MMOL/L — SIGNIFICANT CHANGE UP (ref 22–31)
COLOR SPEC: YELLOW — SIGNIFICANT CHANGE UP
CREAT SERPL-MCNC: 0.77 MG/DL — SIGNIFICANT CHANGE UP (ref 0.5–1.3)
EOSINOPHIL # BLD AUTO: 0.2 K/UL — SIGNIFICANT CHANGE UP (ref 0–0.5)
EOSINOPHIL NFR BLD AUTO: 1.6 % — SIGNIFICANT CHANGE UP (ref 0–6)
GLUCOSE SERPL-MCNC: 94 MG/DL — SIGNIFICANT CHANGE UP (ref 70–99)
GLUCOSE UR-MCNC: NEGATIVE — SIGNIFICANT CHANGE UP
HCG SERPL-ACNC: 14.05 MIU/ML — SIGNIFICANT CHANGE UP
HCT VFR BLD CALC: 32.3 % — LOW (ref 34.5–45)
HGB BLD-MCNC: 10.4 G/DL — LOW (ref 11.5–15.5)
IMM GRANULOCYTES # BLD AUTO: 0.19 # — SIGNIFICANT CHANGE UP
IMM GRANULOCYTES NFR BLD AUTO: 1.5 % — SIGNIFICANT CHANGE UP (ref 0–1.5)
INR BLD: 1.16 — SIGNIFICANT CHANGE UP (ref 0.88–1.17)
KETONES UR-MCNC: NEGATIVE — SIGNIFICANT CHANGE UP
LEUKOCYTE ESTERASE UR-ACNC: HIGH
LYMPHOCYTES # BLD AUTO: 1.69 K/UL — SIGNIFICANT CHANGE UP (ref 1–3.3)
LYMPHOCYTES # BLD AUTO: 13.1 % — SIGNIFICANT CHANGE UP (ref 13–44)
MCHC RBC-ENTMCNC: 28.7 PG — SIGNIFICANT CHANGE UP (ref 27–34)
MCHC RBC-ENTMCNC: 32.2 % — SIGNIFICANT CHANGE UP (ref 32–36)
MCV RBC AUTO: 89.2 FL — SIGNIFICANT CHANGE UP (ref 80–100)
MONOCYTES # BLD AUTO: 0.88 K/UL — SIGNIFICANT CHANGE UP (ref 0–0.9)
MONOCYTES NFR BLD AUTO: 6.8 % — SIGNIFICANT CHANGE UP (ref 2–14)
MUCOUS THREADS # UR AUTO: SIGNIFICANT CHANGE UP
NEUTROPHILS # BLD AUTO: 9.87 K/UL — HIGH (ref 1.8–7.4)
NEUTROPHILS NFR BLD AUTO: 76.8 % — SIGNIFICANT CHANGE UP (ref 43–77)
NITRITE UR-MCNC: NEGATIVE — SIGNIFICANT CHANGE UP
NON-SQ EPI CELLS # UR AUTO: 3 — SIGNIFICANT CHANGE UP
NRBC # FLD: 0 — SIGNIFICANT CHANGE UP
PH UR: 6 — SIGNIFICANT CHANGE UP (ref 4.6–8)
PLATELET # BLD AUTO: 384 K/UL — SIGNIFICANT CHANGE UP (ref 150–400)
PMV BLD: 9.3 FL — SIGNIFICANT CHANGE UP (ref 7–13)
POTASSIUM SERPL-MCNC: 4.3 MMOL/L — SIGNIFICANT CHANGE UP (ref 3.5–5.3)
POTASSIUM SERPL-SCNC: 4.3 MMOL/L — SIGNIFICANT CHANGE UP (ref 3.5–5.3)
PROT SERPL-MCNC: 7.7 G/DL — SIGNIFICANT CHANGE UP (ref 6–8.3)
PROT UR-MCNC: 30 MG/DL — HIGH
PROTHROM AB SERPL-ACNC: 12.9 SEC — SIGNIFICANT CHANGE UP (ref 9.8–13.1)
RBC # BLD: 3.62 M/UL — LOW (ref 3.8–5.2)
RBC # FLD: 13.3 % — SIGNIFICANT CHANGE UP (ref 10.3–14.5)
RBC CASTS # UR COMP ASSIST: >50 — HIGH (ref 0–?)
SODIUM SERPL-SCNC: 141 MMOL/L — SIGNIFICANT CHANGE UP (ref 135–145)
SP GR SPEC: 1.02 — SIGNIFICANT CHANGE UP (ref 1–1.04)
SQUAMOUS # UR AUTO: SIGNIFICANT CHANGE UP
UROBILINOGEN FLD QL: NORMAL MG/DL — SIGNIFICANT CHANGE UP
WBC # BLD: 12.86 K/UL — HIGH (ref 3.8–10.5)
WBC # FLD AUTO: 12.86 K/UL — HIGH (ref 3.8–10.5)
WBC CLUMPS #/AREA URNS HPF: PRESENT — HIGH (ref 0–?)
WBC UR QL: >50 — HIGH (ref 0–?)

## 2018-01-23 PROCEDURE — 74177 CT ABD & PELVIS W/CONTRAST: CPT | Mod: 26

## 2018-01-23 PROCEDURE — 99285 EMERGENCY DEPT VISIT HI MDM: CPT | Mod: 25

## 2018-01-23 RX ORDER — CEPHALEXIN 500 MG
1 CAPSULE ORAL
Qty: 14 | Refills: 0 | OUTPATIENT
Start: 2018-01-23 | End: 2018-01-29

## 2018-01-23 RX ORDER — CEPHALEXIN 500 MG
1 CAPSULE ORAL
Qty: 21 | Refills: 0 | OUTPATIENT
Start: 2018-01-23 | End: 2018-01-29

## 2018-01-23 RX ORDER — CEPHALEXIN 500 MG
500 CAPSULE ORAL ONCE
Qty: 0 | Refills: 0 | Status: COMPLETED | OUTPATIENT
Start: 2018-01-23 | End: 2018-01-23

## 2018-01-23 RX ORDER — ERTAPENEM SODIUM 1 G/1
1000 INJECTION, POWDER, LYOPHILIZED, FOR SOLUTION INTRAMUSCULAR; INTRAVENOUS ONCE
Qty: 0 | Refills: 0 | Status: DISCONTINUED | OUTPATIENT
Start: 2018-01-23 | End: 2018-01-23

## 2018-01-23 RX ADMIN — Medication 500 MILLIGRAM(S): at 06:27

## 2018-01-23 NOTE — ED PROVIDER NOTE - PLAN OF CARE
Take medication as prescribed, follow up with your OBGYN in 24 hours. Rest, drink plenty of fluids.  Advance activity as tolerated.  Continue all previously prescribed medications as directed. You can use motrin 600mg every 6-8 hours for pain or fever, and/or Tylenol 650 mg every 4 hours for pain/fever. Follow up with your primary care physician in 48-72 hours- bring copies of your results.  Return to the emergency department for chest pain, shortness of breath, dizziness, or worsening, concerning, or persistent symptoms.

## 2018-01-23 NOTE — ED PROVIDER NOTE - GASTROINTESTINAL, MLM
Abdomen soft, non-tender, no guarding. c section scar with bloody-clear discharge on right edge, TTP, surrounding erythema and swelling.

## 2018-01-23 NOTE — ED PROVIDER NOTE - PROGRESS NOTE DETAILS
REYNALDO Doyle: call from radiology, ct with + abscess and concern for even possible pyometra. wbc 12 pt doing well no complaints afebrile VSS. gyn consulted. will come to see patient shortly. will be contacting me back re; abx choice. REYNALDO Doyle: obgyn resident at bedside, evaluating patient d/w OB attending CT reviewed and not concerned for abscess, likely hematoma, and cellulitis over lying. Plan for DC home on keflex. ED attending Dr. Lee to speak with OBGYN attending. REYNALDO Doyle: obgyn attending Dr. Pinedo here with OB resident at bedside, OB attending d/w radiology, radiologist concern for abscess however OB not concerned for abscess believes this is more post surgical changes given that patient is well appearing with VSS. OBGYN attg recc keflex for cellulitis of incision site and out patient follow up. discussed with patient staying in ED for IV abx and montioring to eval for progression of infection or decompensation however patient and  state they would rather go home on PO meds as she has infant at home. I discussed that there is the possibility for worsening symptoms and infection and patient must return to ED if that occurs. Pt states she will return for any worsening or concerning symptoms.

## 2018-01-23 NOTE — CONSULT NOTE ADULT - ASSESSMENT
s/p emergency c/section 1 week ago with RLQ pain and superficial erythema whose clinical presentation is benign. Pt and  refuse to stay for observation- they want to be discharged and will return if pt develops fever or increased abdominal pain

## 2018-01-23 NOTE — CONSULT NOTE ADULT - SUBJECTIVE AND OBJECTIVE BOX
R2 GYN Consult Note    27y G_P_, LMP      presents with       OBHx:  GynHx:   PMH:  PSH:  All:  Meds:   SocialHx:     Vital Signs Last 24 Hrs  T(C): 36.7 (2018 01:47), Max: 36.7 (2018 01:47)  T(F): 98 (2018 01:47), Max: 98 (2018 01:47)  HR: 99 (2018 01:47) (99 - 99)  BP: 108/67 (2018 01:47) (108/67 - 108/67)  BP(mean): --  RR: 18 (2018 01:47) (18 - 18)  SpO2: --    PE:  Gen: Comfortable, NAD  CV: RRR  Pulm: CTAB  Abd: Soft, NT  Ext: No edema or tenderness bilaterally  Spec Exam:    LABS:                        10.4   12.86 )-----------( 384      ( 2018 03:30 )             32.3         141  |  105  |  13  ----------------------------<  94  4.3   |  23  |  0.77    Ca    8.8      2018 03:30    TPro  7.7  /  Alb  3.4  /  TBili  0.2  /  DBili  x   /  AST  18  /  ALT  19  /  AlkPhos  148<H>  23    PT/INR - ( 2018 03:30 )   PT: 12.9 SEC;   INR: 1.16          PTT - ( 2018 03:30 )  PTT:30.3 SEC  Urinalysis Basic - ( 2018 03:30 )    Color: YELLOW / Appearance: HAZY / S.020 / pH: 6.0  Gluc: NEGATIVE / Ketone: NEGATIVE  / Bili: NEGATIVE / Urobili: NORMAL mg/dL   Blood: LARGE / Protein: 30 mg/dL / Nitrite: NEGATIVE   Leuk Esterase: LARGE / RBC: >50 / WBC >50   Sq Epi: OCC / Non Sq Epi: x / Bacteria: x        RADIOLOGY & ADDITIONAL STUDIES:  < from: CT Abdomen and Pelvis w/ IV Cont (18 @ 03:57) >  FINDINGS:    LOWER CHEST: Within normal limits.    LIVER: Within normal limits.  BILE DUCTS: Normal caliber.  GALLBLADDER: Within normal limits.  SPLEEN: Within normal limits.  PANCREAS: Within normal limits.  ADRENALS: Within normal limits.  KIDNEYS/URETERS: Within normal limits.    BLADDER: Within normal limits.  REPRODUCTIVE ORGANS: Enlarged uterus with focal area of decreased   enhancement predominantly in the posterior wall (601-78). There is 3 x   3.4 cm sized rim-enhancing fluid collection with extensive surrounding   fat stranding in the left adnexal area (2-97).    BOWEL: No bowel obstruction. Appendix      PERITONEUM: No ascites.  VESSELS:  Within normal limits.  RETROPERITONEUM: No lymphadenopathy.    ABDOMINAL WALL: Fat stranding with subcutaneous air associated with   recent ..  BONES: Within normal limits.    IMPRESSION: Intrapelvic abscess with extensive fat stranding.    Heterogeneous enhancement of the uterus predominantly in the posterior   wall, concerning for inflammation involving the uterine wall (pyometra?). R2 GYN Consult Note    28yo  POD#8 s/p primary  for NRFHT presenting with pain around incision. Patient was at her OB today for postop visit, and reports feeling sharp pain along the right side of her incision after steri strips were removed. It self-resolved; however, she noticed bloody discharge from the right end of the incision. Patient denies fevers, chills, SOB, chest pain, nausea, or vomiting. She is tolerating regular diet, voiding freely without dysuria, having regular bowel movements. No diarrhea. Pt reports minimal lochia  Patient is currently breastfeeding.    OB at [facility]    ObHx:  x1, SAB x1  PMH: denies  NKDA      Vital Signs Last 24 Hrs  T(C): 36.7 (2018 01:47), Max: 36.7 (2018 01:47)  T(F): 98 (2018 01:47), Max: 98 (2018 01:47)  HR: 99 (2018 01:47) (99 - 99)  BP: 108/67 (2018 01:47) (108/67 - 108/67)  RR: 18 (2018 01:47) (18 - 18)    PE:  Gen: Comfortable, NAD  CV: RRR  Pulm: CTAB, no crackles or wheezing  Abd: normoactive bowel sounds, soft, nontender  Incision: Pfannenstiel skin incision, skin intact, ~2cm circumferential erythema around right half of incision, induration along incision, no fluctuance. small serosanguinous discharge at right edge of incision.  Ext: No edema or tenderness bilaterally  Bimanual: anteverted uterus nontender, no CMT, no adnexal tenderness    LABS:                        10.4   12.86 )-----------( 384      ( 2018 03:30 )             32.3     -    141  |  105  |  13  ----------------------------<  94  4.3   |  23  |  0.77    Ca    8.8      2018 03:30    TPro  7.7  /  Alb  3.4  /  TBili  0.2  /  DBili  x   /  AST  18  /  ALT  19  /  AlkPhos  148<H>  23    PT/INR - ( 2018 03:30 )   PT: 12.9 SEC;   INR: 1.16          PTT - ( 2018 03:30 )  PTT:30.3 SEC  Urinalysis Basic - ( 2018 03:30 )    Color: YELLOW / Appearance: HAZY / S.020 / pH: 6.0  Gluc: NEGATIVE / Ketone: NEGATIVE  / Bili: NEGATIVE / Urobili: NORMAL mg/dL   Blood: LARGE / Protein: 30 mg/dL / Nitrite: NEGATIVE   Leuk Esterase: LARGE / RBC: >50 / WBC >50   Sq Epi: OCC / Non Sq Epi: x / Bacteria: x        RADIOLOGY & ADDITIONAL STUDIES:  < from: CT Abdomen and Pelvis w/ IV Cont (18 @ 03:57) >  FINDINGS:    LOWER CHEST: Within normal limits.    LIVER: Within normal limits.  BILE DUCTS: Normal caliber.  GALLBLADDER: Within normal limits.  SPLEEN: Within normal limits.  PANCREAS: Within normal limits.  ADRENALS: Within normal limits.  KIDNEYS/URETERS: Within normal limits.    BLADDER: Within normal limits.  REPRODUCTIVE ORGANS: Enlarged uterus with focal area of decreased   enhancement predominantly in the posterior wall (601-78). There is 3 x   3.4 cm sized rim-enhancing fluid collection with extensive surrounding   fat stranding in the left adnexal area (2-97).    BOWEL: No bowel obstruction. Appendix      PERITONEUM: No ascites.  VESSELS:  Within normal limits.  RETROPERITONEUM: No lymphadenopathy.    ABDOMINAL WALL: Fat stranding with subcutaneous air associated with   recent ..  BONES: Within normal limits.    IMPRESSION: Intrapelvic abscess with extensive fat stranding.    Heterogeneous enhancement of the uterus predominantly in the posterior   wall, concerning for inflammation involving the uterine wall (pyometra?).

## 2018-01-23 NOTE — CONSULT NOTE ADULT - SUBJECTIVE AND OBJECTIVE BOX
S: denies fever, chills, diarrhea  O: VSS. Afebrile  ABD: soft, NT; incision with superficial erythema R side bouth superior and inferior       small amount of serous drainage from extreme right side of incision- incision intact; no bleeding  Pelvic:deferred  Exts: no CCE    Labs: WBC=12 (same as on discharge from hospital)    CT Scan: 3 cm collection with fat stranding left adnexa - possible abscess

## 2018-01-23 NOTE — ED PROVIDER NOTE - ATTENDING CONTRIBUTION TO CARE
DR. STACK, ATTENDING MD-  I performed a face to face bedside interview with patient regarding history of present illness, review of symptoms and past medical history. I completed an independent physical exam.  I have discussed patient's plan of care with PA.   Documentation as above in the note.    26 y/o female with c/s 8 days prior with c/o redness and drainage from c/s site today.  Denies f/c, ha, neck stiffness, cp, sob, cough, abd pain, n/v/d, dysuria.  Afebrile, vs wnl, nad, ctabil, s1s2 rrr no m/r/g, abd soft non ttp no r/g, mild erythema around c/s site with scant bloody dc, no cva tenderness b/l, no leg swelling b/l, no rash.  CT read by radiology as concern for pyometra.  OB/GYN recs:  po abx, o/p f/u as well appearing.  Pt agrees with this plan, will dc with abx and close ob f/u, to return immediately if any worsening sx.

## 2018-01-23 NOTE — ED PROVIDER NOTE - CHPI ED SYMPTOMS NEG
no nausea/no chills/no tingling/no vomiting/no numbness/no dizziness/no fever/no weakness/no decreased eating/drinking

## 2018-01-23 NOTE — ED PROVIDER NOTE - GASTROINTESTINAL NEGATIVE STATEMENT, MLM
+ abdominal pain to incision site with oozing, no bloating, no constipation, no diarrhea, no nausea and no vomiting.

## 2018-01-23 NOTE — ED PROVIDER NOTE - CARE PLAN
Principal Discharge DX:	Cellulitis  Assessment and plan of treatment:	Take medication as prescribed, follow up with your OBGYN in 24 hours. Rest, drink plenty of fluids.  Advance activity as tolerated.  Continue all previously prescribed medications as directed. You can use motrin 600mg every 6-8 hours for pain or fever, and/or Tylenol 650 mg every 4 hours for pain/fever. Follow up with your primary care physician in 48-72 hours- bring copies of your results.  Return to the emergency department for chest pain, shortness of breath, dizziness, or worsening, concerning, or persistent symptoms.

## 2018-01-23 NOTE — ED ADULT TRIAGE NOTE - CHIEF COMPLAINT QUOTE
pt had c section last Monday and she had follow up appointment today when the Dr removed the tape the site started bleeding. minimal bleeding . pt had c section last Monday and she had follow up appointment today when the Dr removed the tape the site started bleeding.  very minimal bleeding . denies any pain.

## 2018-01-23 NOTE — CONSULT NOTE ADULT - ASSESSMENT
28yo  POD#8 s/p primary  for NRFHT with incisional pain found to have wound cellulitis    Patient is afebrile and hemodynamically stable, without significant tenderness on exam. Incision appears intact with small serosanguinous discharge and induration consistent with cellulitis.   Mild leukocytosis unchanged from one week ago.  CT findings concerning for normal postop changes vs early stages of developing abscess. As patient is currently stable without clinical signs of intra-abdominal infection, pt will benefit from cellulitis treatment. In light of unclear findings, patient counseled to remain in hospital for observation to watch for possible development of infection. Patient and  declined, and would rather go home to their infant  - Keflex 500mg bid x 7d  - pt given fever precautions, and strongly advised to go to OB office for f/u within 48hrs    Patient seen and examined with Dr. Shahida Lozada, PGY2 28yo  POD#8 s/p primary  for NRFHT with incisional pain found to have wound cellulitis    Patient is afebrile and hemodynamically stable, without significant tenderness on exam. Incision appears intact with small serosanguinous discharge and induration consistent with cellulitis.   Mild leukocytosis unchanged from one week ago.  CT findings concerning for normal postop changes vs early stages of developing abscess. As patient is currently stable without clinical signs of intra-abdominal infection, pt will benefit from cellulitis treatment. In light of unclear findings, patient counseled to remain in hospital for observation to watch for possible development of infection. Patient and  declined, and would rather go home to their infant.

## 2018-01-23 NOTE — ED PROVIDER NOTE - MEDICAL DECISION MAKING DETAILS
26 yo F here for oozing from  site, redness, swelling, warmth to area x today. pt had dressing removed today. concern for abscess to area given erythema and swelling and increase in pain. Will obtain labs and CT to r/o abscess. GYN at OSH.

## 2018-01-23 NOTE — ED PROVIDER NOTE - OBJECTIVE STATEMENT
28 yo F  here for oozing from  site x today. Pt reports she had  at OSH on 1/15, no complications, went to OBGYN today and had adhesive dressing removed from site. As the day went on patient noted swelling, redness, warmth, and drainage from the wound. Bloody-clear discharge. Also reports tactile temp, but no fever on thermometer. Pt reports pain to incision site which she feels has increased today. Denies fever, chills, vomiting, diarrhea, CP, SOB,  rash, HA, dizziness.

## 2018-01-24 ENCOUNTER — INPATIENT (INPATIENT)
Facility: HOSPITAL | Age: 28
LOS: 4 days | Discharge: HOME HEALTH | End: 2018-01-29
Attending: OBSTETRICS & GYNECOLOGY | Admitting: OBSTETRICS & GYNECOLOGY
Payer: MEDICAID

## 2018-01-24 VITALS
OXYGEN SATURATION: 99 % | DIASTOLIC BLOOD PRESSURE: 71 MMHG | TEMPERATURE: 99 F | HEART RATE: 104 BPM | SYSTOLIC BLOOD PRESSURE: 116 MMHG | RESPIRATION RATE: 16 BRPM

## 2018-01-24 DIAGNOSIS — L03.90 CELLULITIS, UNSPECIFIED: ICD-10-CM

## 2018-01-24 DIAGNOSIS — T14.8XXA OTHER INJURY OF UNSPECIFIED BODY REGION, INITIAL ENCOUNTER: ICD-10-CM

## 2018-01-24 LAB
ALBUMIN SERPL ELPH-MCNC: 3.2 G/DL — LOW (ref 3.3–5)
ALP SERPL-CCNC: 141 U/L — HIGH (ref 40–120)
ALT FLD-CCNC: 17 U/L — SIGNIFICANT CHANGE UP (ref 4–33)
APPEARANCE UR: SIGNIFICANT CHANGE UP
AST SERPL-CCNC: 23 U/L — SIGNIFICANT CHANGE UP (ref 4–32)
BACTERIA # UR AUTO: SIGNIFICANT CHANGE UP
BASE EXCESS BLDV CALC-SCNC: -0.4 MMOL/L — SIGNIFICANT CHANGE UP
BASOPHILS # BLD AUTO: 0.03 K/UL — SIGNIFICANT CHANGE UP (ref 0–0.2)
BASOPHILS NFR BLD AUTO: 0.2 % — SIGNIFICANT CHANGE UP (ref 0–2)
BILIRUB SERPL-MCNC: 0.3 MG/DL — SIGNIFICANT CHANGE UP (ref 0.2–1.2)
BILIRUB UR-MCNC: NEGATIVE — SIGNIFICANT CHANGE UP
BLOOD GAS VENOUS - CREATININE: 0.71 MG/DL — SIGNIFICANT CHANGE UP (ref 0.5–1.3)
BLOOD UR QL VISUAL: HIGH
BUN SERPL-MCNC: 10 MG/DL — SIGNIFICANT CHANGE UP (ref 7–23)
CALCIUM SERPL-MCNC: 8.5 MG/DL — SIGNIFICANT CHANGE UP (ref 8.4–10.5)
CHLORIDE BLDV-SCNC: 107 MMOL/L — SIGNIFICANT CHANGE UP (ref 96–108)
CHLORIDE SERPL-SCNC: 104 MMOL/L — SIGNIFICANT CHANGE UP (ref 98–107)
CO2 SERPL-SCNC: 22 MMOL/L — SIGNIFICANT CHANGE UP (ref 22–31)
COLOR SPEC: YELLOW — SIGNIFICANT CHANGE UP
CREAT SERPL-MCNC: 0.67 MG/DL — SIGNIFICANT CHANGE UP (ref 0.5–1.3)
EOSINOPHIL # BLD AUTO: 0.06 K/UL — SIGNIFICANT CHANGE UP (ref 0–0.5)
EOSINOPHIL NFR BLD AUTO: 0.4 % — SIGNIFICANT CHANGE UP (ref 0–6)
GAS PNL BLDV: 141 MMOL/L — SIGNIFICANT CHANGE UP (ref 136–146)
GLUCOSE BLDV-MCNC: 96 — SIGNIFICANT CHANGE UP (ref 70–99)
GLUCOSE SERPL-MCNC: 91 MG/DL — SIGNIFICANT CHANGE UP (ref 70–99)
GLUCOSE UR-MCNC: NEGATIVE — SIGNIFICANT CHANGE UP
HCO3 BLDV-SCNC: 23 MMOL/L — SIGNIFICANT CHANGE UP (ref 20–27)
HCT VFR BLD CALC: 31 % — LOW (ref 34.5–45)
HCT VFR BLDV CALC: 30.5 % — LOW (ref 34.5–45)
HGB BLD-MCNC: 10.4 G/DL — LOW (ref 11.5–15.5)
HGB BLDV-MCNC: 9.9 G/DL — LOW (ref 11.5–15.5)
HYALINE CASTS # UR AUTO: SIGNIFICANT CHANGE UP (ref 0–?)
IMM GRANULOCYTES # BLD AUTO: 0.15 # — SIGNIFICANT CHANGE UP
IMM GRANULOCYTES NFR BLD AUTO: 0.9 % — SIGNIFICANT CHANGE UP (ref 0–1.5)
KETONES UR-MCNC: NEGATIVE — SIGNIFICANT CHANGE UP
LACTATE BLDV-MCNC: 1 MMOL/L — SIGNIFICANT CHANGE UP (ref 0.5–2)
LEUKOCYTE ESTERASE UR-ACNC: HIGH
LYMPHOCYTES # BLD AUTO: 1.69 K/UL — SIGNIFICANT CHANGE UP (ref 1–3.3)
LYMPHOCYTES # BLD AUTO: 10.3 % — LOW (ref 13–44)
MCHC RBC-ENTMCNC: 30.2 PG — SIGNIFICANT CHANGE UP (ref 27–34)
MCHC RBC-ENTMCNC: 33.5 % — SIGNIFICANT CHANGE UP (ref 32–36)
MCV RBC AUTO: 90.1 FL — SIGNIFICANT CHANGE UP (ref 80–100)
MONOCYTES # BLD AUTO: 0.7 K/UL — SIGNIFICANT CHANGE UP (ref 0–0.9)
MONOCYTES NFR BLD AUTO: 4.2 % — SIGNIFICANT CHANGE UP (ref 2–14)
MUCOUS THREADS # UR AUTO: SIGNIFICANT CHANGE UP
NEUTROPHILS # BLD AUTO: 13.85 K/UL — HIGH (ref 1.8–7.4)
NEUTROPHILS NFR BLD AUTO: 84 % — HIGH (ref 43–77)
NITRITE UR-MCNC: NEGATIVE — SIGNIFICANT CHANGE UP
NON-SQ EPI CELLS # UR AUTO: <1 — SIGNIFICANT CHANGE UP
NRBC # FLD: 0 — SIGNIFICANT CHANGE UP
PCO2 BLDV: 44 MMHG — SIGNIFICANT CHANGE UP (ref 41–51)
PH BLDV: 7.36 PH — SIGNIFICANT CHANGE UP (ref 7.32–7.43)
PH UR: 6 — SIGNIFICANT CHANGE UP (ref 4.6–8)
PLATELET # BLD AUTO: 415 K/UL — HIGH (ref 150–400)
PMV BLD: 9.3 FL — SIGNIFICANT CHANGE UP (ref 7–13)
PO2 BLDV: 28 MMHG — LOW (ref 35–40)
POTASSIUM BLDV-SCNC: 3.8 MMOL/L — SIGNIFICANT CHANGE UP (ref 3.4–4.5)
POTASSIUM SERPL-MCNC: 4.9 MMOL/L — SIGNIFICANT CHANGE UP (ref 3.5–5.3)
POTASSIUM SERPL-SCNC: 4.9 MMOL/L — SIGNIFICANT CHANGE UP (ref 3.5–5.3)
PROT SERPL-MCNC: 7.7 G/DL — SIGNIFICANT CHANGE UP (ref 6–8.3)
PROT UR-MCNC: 30 MG/DL — HIGH
RBC # BLD: 3.44 M/UL — LOW (ref 3.8–5.2)
RBC # FLD: 13.2 % — SIGNIFICANT CHANGE UP (ref 10.3–14.5)
RBC CASTS # UR COMP ASSIST: >50 — HIGH (ref 0–?)
SAO2 % BLDV: 38.8 % — LOW (ref 60–85)
SODIUM SERPL-SCNC: 140 MMOL/L — SIGNIFICANT CHANGE UP (ref 135–145)
SP GR SPEC: 1.02 — SIGNIFICANT CHANGE UP (ref 1–1.04)
SQUAMOUS # UR AUTO: SIGNIFICANT CHANGE UP
UROBILINOGEN FLD QL: NORMAL MG/DL — SIGNIFICANT CHANGE UP
WBC # BLD: 16.48 K/UL — HIGH (ref 3.8–10.5)
WBC # FLD AUTO: 16.48 K/UL — HIGH (ref 3.8–10.5)
WBC UR QL: >50 — HIGH (ref 0–?)

## 2018-01-24 PROCEDURE — 99222 1ST HOSP IP/OBS MODERATE 55: CPT | Mod: GC

## 2018-01-24 RX ORDER — SODIUM CHLORIDE 9 MG/ML
1000 INJECTION INTRAMUSCULAR; INTRAVENOUS; SUBCUTANEOUS ONCE
Qty: 0 | Refills: 0 | Status: COMPLETED | OUTPATIENT
Start: 2018-01-24 | End: 2018-01-24

## 2018-01-24 RX ORDER — PIPERACILLIN AND TAZOBACTAM 4; .5 G/20ML; G/20ML
3.38 INJECTION, POWDER, LYOPHILIZED, FOR SOLUTION INTRAVENOUS EVERY 8 HOURS
Qty: 0 | Refills: 0 | Status: DISCONTINUED | OUTPATIENT
Start: 2018-01-25 | End: 2018-01-28

## 2018-01-24 RX ORDER — PIPERACILLIN AND TAZOBACTAM 4; .5 G/20ML; G/20ML
3.38 INJECTION, POWDER, LYOPHILIZED, FOR SOLUTION INTRAVENOUS ONCE
Qty: 0 | Refills: 0 | Status: COMPLETED | OUTPATIENT
Start: 2018-01-24 | End: 2018-01-24

## 2018-01-24 RX ORDER — ACETAMINOPHEN 500 MG
650 TABLET ORAL ONCE
Qty: 0 | Refills: 0 | Status: COMPLETED | OUTPATIENT
Start: 2018-01-24 | End: 2018-01-24

## 2018-01-24 RX ORDER — SODIUM CHLORIDE 9 MG/ML
1000 INJECTION, SOLUTION INTRAVENOUS
Qty: 0 | Refills: 0 | Status: DISCONTINUED | OUTPATIENT
Start: 2018-01-24 | End: 2018-01-28

## 2018-01-24 RX ADMIN — SODIUM CHLORIDE 1000 MILLILITER(S): 9 INJECTION INTRAMUSCULAR; INTRAVENOUS; SUBCUTANEOUS at 18:36

## 2018-01-24 RX ADMIN — Medication 650 MILLIGRAM(S): at 19:21

## 2018-01-24 RX ADMIN — SODIUM CHLORIDE 30 MILLILITER(S): 9 INJECTION, SOLUTION INTRAVENOUS at 21:19

## 2018-01-24 RX ADMIN — PIPERACILLIN AND TAZOBACTAM 200 GRAM(S): 4; .5 INJECTION, POWDER, LYOPHILIZED, FOR SOLUTION INTRAVENOUS at 19:21

## 2018-01-24 RX ADMIN — SODIUM CHLORIDE 1000 MILLILITER(S): 9 INJECTION INTRAMUSCULAR; INTRAVENOUS; SUBCUTANEOUS at 19:21

## 2018-01-24 RX ADMIN — Medication 1 TABLET(S): at 21:14

## 2018-01-24 NOTE — H&P ADULT - HISTORY OF PRESENT ILLNESS
R2 GYN Admit Note    27y      OBHx:  GynHx:   PMH:  PSH:  All:  Meds:   SocialHx:     Vital Signs Last 24 Hrs  T(C): 38.3 (2018 18:36), Max: 38.3 (2018 18:36)  T(F): 101 (2018 18:36), Max: 101 (2018 18:36)  HR: 85 (2018 19:22) (85 - 104)  BP: 116/64 (2018 19:22) (116/64 - 116/71)  BP(mean): --  RR: 16 (2018 19:22) (16 - 16)  SpO2: 100% (2018 19:22) (99% - 100%)    PE:  Gen: Comfortable, NAD  CV: RRR  Pulm: CTAB  Abd: Soft, NT  Ext: No edema or tenderness bilaterally  Spec Exam:    LABS:                        10.4   16.48 )-----------( 415      ( 2018 18:23 )             31.0                         10.4   12.86 )-----------( 384      ( 2018 03:30 )             32.3     01-24    140  |  104  |  10  ----------------------------<  91  4.9   |  22  |  0.67    Ca    8.5      2018 18:23    TPro  7.7  /  Alb  3.2<L>  /  TBili  0.3  /  DBili  x   /  AST  23  /  ALT  17  /  AlkPhos  141<H>  01-24    PT/INR - ( 2018 03:30 )   PT: 12.9 SEC;   INR: 1.16          PTT - ( 2018 03:30 )  PTT:30.3 SEC  Urinalysis Basic - ( 2018 18:36 )    Color: YELLOW / Appearance: HAZY / S.018 / pH: 6.0  Gluc: NEGATIVE / Ketone: NEGATIVE  / Bili: NEGATIVE / Urobili: NORMAL mg/dL   Blood: LARGE / Protein: 30 mg/dL / Nitrite: NEGATIVE   Leuk Esterase: LARGE / RBC: >50 / WBC >50   Sq Epi: OCC / Non Sq Epi: x / Bacteria: FEW        RADIOLOGY & ADDITIONAL STUDIES:  < from: CT Abdomen and Pelvis w/ IV Cont (18 @ 03:57) >  LOWER CHEST: Within normal limits.    LIVER: Within normal limits.  BILE DUCTS: Normal caliber.  GALLBLADDER: Within normal limits.  SPLEEN: Within normal limits.  PANCREAS: Within normal limits.  ADRENALS: Within normal limits.  KIDNEYS/URETERS: Within normal limits.    BLADDER: Within normal limits.  REPRODUCTIVE ORGANS: Enlarged uterus with focal area of decreased   enhancement predominantly in the posterior wall (601-78). There is 3 x   3.4 cm sized rim-enhancing fluid collection with extensive surrounding   fat stranding in the left adnexal area (2-97).    BOWEL: No bowel obstruction. Appendix      PERITONEUM: No ascites.  VESSELS:  Within normal limits.  RETROPERITONEUM: No lymphadenopathy.    ABDOMINAL WALL: Fat stranding with subcutaneous air associated with   recent ..  BONES: Within normal limits.    IMPRESSION: Intrapelvic abscess with extensive fat stranding. R2 GYN Admit Note    26yo  POD#9 s/p primary  for NRFHT presenting with fever at home to 100.8F. Patient was seen in ED two days ago for pain around right side of incision. At that time, she was diagnosed with wound cellulitis and discharged with Keflex. Patient reports feeling well, and taking her temp intermittently. Today, temps ranged from 99 to 100.3, and then 100.8 so she decided to come in. Denies sore throat, cough, chills, chest pain, SOB.  She is tolerating regular diet, voiding freely without dysuria, having regular bowel movements. No diarrhea. Pt reports minimal lochia. Patient is currently breastfeeding.    In ED, pt is febrile.      OB at [facility]    ObHx:  x1, SAB x1  PAST MEDICAL & SURGICAL HISTORY:  Miscarriage: 2016  No significant past surgical history  Home Medications:  acetaminophen 325 mg oral tablet: 3 tab(s) orally every 6 hours (2018 08:06)  ibuprofen 600 mg oral tablet: 1 tab(s) orally every 6 hours (2018 08:06)  Prenatal Multivitamins with Folic Acid 1 mg oral tablet: 1 tab(s) orally once a day (2018 08:06)    Vital Signs Last 24 Hrs  T(C): 38.3 (2018 18:36), Max: 38.3 (2018 18:36)  T(F): 101 (2018 18:36), Max: 101 (2018 18:36)  HR: 85 (2018 19:22) (85 - 104)  BP: 116/64 (2018 19:22) (116/64 - 116/71)  RR: 16 (2018 19:22) (16 - 16)  SpO2: 100% (2018 19:22) (99% - 100%)    PE:  Gen: Comfortable, NAD  CV: RRR, no murmurs  Pulm: CTAB  Abd: normoactive bowel sounds, soft, nontender  Incision: Pfannenstiel skin incision, erythema ~4cm above incision, +induration along the length, ~2cm skin opening on right edge of incision, probed to 4cm deep, fascia intact. No fluctuance. Wound packed with iodoform  Ext: No edema or tenderness bilaterally  Pelvic exam deferred    LABS:                        10.4   16.48 )-----------( 415      ( 2018 18:23 )             31.0                         10.4   12.86 )-----------( 384      ( 2018 03:30 )             32.3         140  |  104  |  10  ----------------------------<  91  4.9   |  22  |  0.67    Ca    8.5      2018 18:23    TPro  7.7  /  Alb  3.2<L>  /  TBili  0.3  /  DBili  x   /  AST  23  /  ALT  17  /  AlkPhos  141<H>      PT/INR - ( 2018 03:30 )   PT: 12.9 SEC;   INR: 1.16          PTT - ( 2018 03:30 )  PTT:30.3 SEC  Urinalysis Basic - ( 2018 18:36 )    Color: YELLOW / Appearance: HAZY / S.018 / pH: 6.0  Gluc: NEGATIVE / Ketone: NEGATIVE  / Bili: NEGATIVE / Urobili: NORMAL mg/dL   Blood: LARGE / Protein: 30 mg/dL / Nitrite: NEGATIVE   Leuk Esterase: LARGE / RBC: >50 / WBC >50   Sq Epi: OCC / Non Sq Epi: x / Bacteria: FEW        RADIOLOGY & ADDITIONAL STUDIES:  < from: CT Abdomen and Pelvis w/ IV Cont (18 @ 03:57) >  LOWER CHEST: Within normal limits.    LIVER: Within normal limits.  BILE DUCTS: Normal caliber.  GALLBLADDER: Within normal limits.  SPLEEN: Within normal limits.  PANCREAS: Within normal limits.  ADRENALS: Within normal limits.  KIDNEYS/URETERS: Within normal limits.    BLADDER: Within normal limits.  REPRODUCTIVE ORGANS: Enlarged uterus with focal area of decreased   enhancement predominantly in the posterior wall (601-78). There is 3 x   3.4 cm sized rim-enhancing fluid collection with extensive surrounding   fat stranding in the left adnexal area (2-97).    BOWEL: No bowel obstruction. Appendix      PERITONEUM: No ascites.  VESSELS:  Within normal limits.  RETROPERITONEUM: No lymphadenopathy.    ABDOMINAL WALL: Fat stranding with subcutaneous air associated with   recent ..  BONES: Within normal limits.    IMPRESSION: Intrapelvic abscess with extensive fat stranding.

## 2018-01-24 NOTE — CONSULT NOTE ADULT - SUBJECTIVE AND OBJECTIVE BOX
R2 GYN Consult Note    26yo  POD#9 s/p primary  for NRFHT presenting with fever at home to 100.8F. Patient was seen in ED two days ago for pain around right side of incision. At that time, she was diagnosed with wound cellulitis and discharged with Keflex    Patient denies fevers, chills, SOB, chest pain, nausea, or vomiting. She is tolerating regular diet, voiding freely without dysuria, having regular bowel movements. No diarrhea. Pt reports minimal lochia  Patient is currently breastfeeding.      OB at [facility]    ObHx:  x1, SAB x1  PMH: denies  NKDA    Vital Signs Last 24 Hrs  T(C): 38.3 (2018 18:36), Max: 38.3 (2018 18:36)  T(F): 101 (2018 18:36), Max: 101 (2018 18:36)  HR: 104 (2018 17:01) (104 - 104)  BP: 116/71 (2018 17:01) (116/71 - 116/71)  RR: 16 (2018 17:01) (16 - 16)  SpO2: 99% (2018 17:01) (99% - 99%)    PE  Gen: Comfortable, NAD  CV: RRR  Breasts:  Pulm: CTAB  Abd: Soft, NT  Ext: No edema or tenderness bilaterally  Spec Exam:    LABS:                        10.4   12.86 )-----------( 384      ( 2018 03:30 )             32.3         141  |  105  |  13  ----------------------------<  94  4.3   |  23  |  0.77    Ca    8.8      2018 03:30    TPro  7.7  /  Alb  3.4  /  TBili  0.2  /  DBili  x   /  AST  18  /  ALT  19  /  AlkPhos  148<H>  23    PT/INR - ( 2018 03:30 )   PT: 12.9 SEC;   INR: 1.16          PTT - ( 2018 03:30 )  PTT:30.3 SEC  Urinalysis Basic - ( 2018 03:30 )    Color: YELLOW / Appearance: HAZY / S.020 / pH: 6.0  Gluc: NEGATIVE / Ketone: NEGATIVE  / Bili: NEGATIVE / Urobili: NORMAL mg/dL   Blood: LARGE / Protein: 30 mg/dL / Nitrite: NEGATIVE   Leuk Esterase: LARGE / RBC: >50 / WBC >50   Sq Epi: OCC / Non Sq Epi: x / Bacteria: x

## 2018-01-24 NOTE — ED ADULT TRIAGE NOTE - CHIEF COMPLAINT QUOTE
Pt s/p  on 1/15, states that she was seen to check her incision, one small area was noted to open up, was sent home with antibiotics. Pt now reports low-grade fever at home and drainage from site. No redness, no pain. Denies URI symptoms.

## 2018-01-24 NOTE — H&P ADULT - ASSESSMENT
26yo  POD#9 s/p primary  with postop wound infection    Pt is hemodynamically stable, with exam findings consistent with wound infection. +Leukocytosis No evidence of fascial dehiscence at this time.

## 2018-01-24 NOTE — ED PROVIDER NOTE - PHYSICAL EXAMINATION
pt nontoxic appearing. normal S1-S2 No resp distress. able to speak in full and clear sentences. no wheeze, rales or stridor.   Breast exam w kalina Lujan RN, no erythema or dc.   soft nontender abdomen. mild erythema on the right side of c sec w mild serosanguinous discharge. erythema has decreased from previously marked area

## 2018-01-24 NOTE — ED PROVIDER NOTE - OBJECTIVE STATEMENT
26yo F no sig pmhx pw temp today. Tmax 100.8 at home. pt states "I feel great" Was in ED two days ago, dx w cellulitis at the c sect site. DC on keflex, has been taking temp intermittently.  Temp range from  today  max was 100.8 Endorses "there was some pus" at the right lower side of incision. Denies any abdominal pain. no nausea/vomit/diarrhea. no dysuria/freq. no breat pain. Is breast feeding wo difficulty. no sob/cough/cp. Denies any foul smelling vag dc.   hx of a C Section on 1-15-18,

## 2018-01-24 NOTE — ED ADULT NURSE NOTE - OBJECTIVE STATEMENT
Pt received to intake ED room 7 with reports of fever s/p  section on 1/15/18. Pt denies pain of any kind at this time. Pt received awake, alert, oriented x4, respirations appearing even, unlabored. Pt denies SOB or chest pain. Pt denies N/V/D or dysuria. C-seciton site observed to have scant amount of drainage on R side of incision, pt reporting she was given antibiotics. 20g PIV placed in L forearm, labs drawn and sent per orders. Rectal temperature 101.0, Dr Abarac made aware. Pt with no apparent acute distress observed at this time, safety maintained.

## 2018-01-24 NOTE — H&P ADULT - PROBLEM SELECTOR PLAN 1
- admit to GYN  - f/u blood, urine cultures  - ZoSyn IV  - breast pump on floor    Patient examined with Dr. Kayli Lozada, PGY2

## 2018-01-25 LAB
BASOPHILS # BLD AUTO: 0.03 K/UL — SIGNIFICANT CHANGE UP (ref 0–0.2)
BASOPHILS NFR BLD AUTO: 0.2 % — SIGNIFICANT CHANGE UP (ref 0–2)
BUN SERPL-MCNC: 11 MG/DL — SIGNIFICANT CHANGE UP (ref 7–23)
CALCIUM SERPL-MCNC: 8 MG/DL — LOW (ref 8.4–10.5)
CHLORIDE SERPL-SCNC: 105 MMOL/L — SIGNIFICANT CHANGE UP (ref 98–107)
CO2 SERPL-SCNC: 25 MMOL/L — SIGNIFICANT CHANGE UP (ref 22–31)
CREAT SERPL-MCNC: 0.63 MG/DL — SIGNIFICANT CHANGE UP (ref 0.5–1.3)
EOSINOPHIL # BLD AUTO: 0.07 K/UL — SIGNIFICANT CHANGE UP (ref 0–0.5)
EOSINOPHIL NFR BLD AUTO: 0.5 % — SIGNIFICANT CHANGE UP (ref 0–6)
GLUCOSE SERPL-MCNC: 77 MG/DL — SIGNIFICANT CHANGE UP (ref 70–99)
HCT VFR BLD CALC: 29.5 % — LOW (ref 34.5–45)
HGB BLD-MCNC: 9.4 G/DL — LOW (ref 11.5–15.5)
IMM GRANULOCYTES # BLD AUTO: 0.12 # — SIGNIFICANT CHANGE UP
IMM GRANULOCYTES NFR BLD AUTO: 0.8 % — SIGNIFICANT CHANGE UP (ref 0–1.5)
LYMPHOCYTES # BLD AUTO: 1.68 K/UL — SIGNIFICANT CHANGE UP (ref 1–3.3)
LYMPHOCYTES # BLD AUTO: 11 % — LOW (ref 13–44)
MCHC RBC-ENTMCNC: 28.8 PG — SIGNIFICANT CHANGE UP (ref 27–34)
MCHC RBC-ENTMCNC: 31.9 % — LOW (ref 32–36)
MCV RBC AUTO: 90.5 FL — SIGNIFICANT CHANGE UP (ref 80–100)
MONOCYTES # BLD AUTO: 0.76 K/UL — SIGNIFICANT CHANGE UP (ref 0–0.9)
MONOCYTES NFR BLD AUTO: 5 % — SIGNIFICANT CHANGE UP (ref 2–14)
NEUTROPHILS # BLD AUTO: 12.55 K/UL — HIGH (ref 1.8–7.4)
NEUTROPHILS NFR BLD AUTO: 82.5 % — HIGH (ref 43–77)
NRBC # FLD: 0 — SIGNIFICANT CHANGE UP
PLATELET # BLD AUTO: 388 K/UL — SIGNIFICANT CHANGE UP (ref 150–400)
PMV BLD: 9.3 FL — SIGNIFICANT CHANGE UP (ref 7–13)
POTASSIUM SERPL-MCNC: 4.1 MMOL/L — SIGNIFICANT CHANGE UP (ref 3.5–5.3)
POTASSIUM SERPL-SCNC: 4.1 MMOL/L — SIGNIFICANT CHANGE UP (ref 3.5–5.3)
RBC # BLD: 3.26 M/UL — LOW (ref 3.8–5.2)
RBC # FLD: 13.2 % — SIGNIFICANT CHANGE UP (ref 10.3–14.5)
SODIUM SERPL-SCNC: 140 MMOL/L — SIGNIFICANT CHANGE UP (ref 135–145)
SPECIMEN SOURCE: SIGNIFICANT CHANGE UP
SPECIMEN SOURCE: SIGNIFICANT CHANGE UP
WBC # BLD: 15.21 K/UL — HIGH (ref 3.8–10.5)
WBC # FLD AUTO: 15.21 K/UL — HIGH (ref 3.8–10.5)

## 2018-01-25 RX ORDER — ACETAMINOPHEN 500 MG
975 TABLET ORAL ONCE
Qty: 0 | Refills: 0 | Status: COMPLETED | OUTPATIENT
Start: 2018-01-25 | End: 2018-01-25

## 2018-01-25 RX ORDER — SIMETHICONE 80 MG/1
80 TABLET, CHEWABLE ORAL EVERY 6 HOURS
Qty: 0 | Refills: 0 | Status: DISCONTINUED | OUTPATIENT
Start: 2018-01-25 | End: 2018-01-29

## 2018-01-25 RX ORDER — HEPARIN SODIUM 5000 [USP'U]/ML
5000 INJECTION INTRAVENOUS; SUBCUTANEOUS EVERY 12 HOURS
Qty: 0 | Refills: 0 | Status: DISCONTINUED | OUTPATIENT
Start: 2018-01-25 | End: 2018-01-29

## 2018-01-25 RX ADMIN — PIPERACILLIN AND TAZOBACTAM 25 GRAM(S): 4; .5 INJECTION, POWDER, LYOPHILIZED, FOR SOLUTION INTRAVENOUS at 20:13

## 2018-01-25 RX ADMIN — SIMETHICONE 80 MILLIGRAM(S): 80 TABLET, CHEWABLE ORAL at 13:21

## 2018-01-25 RX ADMIN — Medication 975 MILLIGRAM(S): at 09:57

## 2018-01-25 RX ADMIN — Medication 975 MILLIGRAM(S): at 21:50

## 2018-01-25 RX ADMIN — Medication 975 MILLIGRAM(S): at 10:45

## 2018-01-25 RX ADMIN — SIMETHICONE 80 MILLIGRAM(S): 80 TABLET, CHEWABLE ORAL at 06:01

## 2018-01-25 RX ADMIN — HEPARIN SODIUM 5000 UNIT(S): 5000 INJECTION INTRAVENOUS; SUBCUTANEOUS at 18:52

## 2018-01-25 RX ADMIN — PIPERACILLIN AND TAZOBACTAM 25 GRAM(S): 4; .5 INJECTION, POWDER, LYOPHILIZED, FOR SOLUTION INTRAVENOUS at 03:26

## 2018-01-25 RX ADMIN — SIMETHICONE 80 MILLIGRAM(S): 80 TABLET, CHEWABLE ORAL at 18:52

## 2018-01-25 RX ADMIN — PIPERACILLIN AND TAZOBACTAM 25 GRAM(S): 4; .5 INJECTION, POWDER, LYOPHILIZED, FOR SOLUTION INTRAVENOUS at 13:20

## 2018-01-25 RX ADMIN — Medication 1 TABLET(S): at 13:21

## 2018-01-25 NOTE — LACTATION INITIAL EVALUATION - INTERVENTION OUTCOME
Pt states she is concerned regarding her milk supply related to her inability to hold her  because of her pain the last few days. Pt states her pain has resolved today and she is now able to handle and hold her . Pt encouraged to breastfeed on demand, then use double electric pump after each feed to hyperstimulate supply. Safe expression, collection and handling of breastmilk reviewed. Pt verbalizes understanding. Warm line and support group encouraged.

## 2018-01-26 ENCOUNTER — TRANSCRIPTION ENCOUNTER (OUTPATIENT)
Age: 28
End: 2018-01-26

## 2018-01-26 LAB
ALBUMIN SERPL ELPH-MCNC: 3 G/DL — LOW (ref 3.3–5)
ALP SERPL-CCNC: 131 U/L — HIGH (ref 40–120)
ALT FLD-CCNC: 13 U/L — SIGNIFICANT CHANGE UP (ref 4–33)
AST SERPL-CCNC: 13 U/L — SIGNIFICANT CHANGE UP (ref 4–32)
BACTERIA UR CULT: SIGNIFICANT CHANGE UP
BASOPHILS # BLD AUTO: 0.02 K/UL — SIGNIFICANT CHANGE UP (ref 0–0.2)
BASOPHILS # BLD AUTO: 0.03 K/UL — SIGNIFICANT CHANGE UP (ref 0–0.2)
BASOPHILS NFR BLD AUTO: 0.1 % — SIGNIFICANT CHANGE UP (ref 0–2)
BASOPHILS NFR BLD AUTO: 0.2 % — SIGNIFICANT CHANGE UP (ref 0–2)
BILIRUB SERPL-MCNC: 0.2 MG/DL — SIGNIFICANT CHANGE UP (ref 0.2–1.2)
BUN SERPL-MCNC: 9 MG/DL — SIGNIFICANT CHANGE UP (ref 7–23)
CALCIUM SERPL-MCNC: 8.4 MG/DL — SIGNIFICANT CHANGE UP (ref 8.4–10.5)
CHLORIDE SERPL-SCNC: 103 MMOL/L — SIGNIFICANT CHANGE UP (ref 98–107)
CO2 SERPL-SCNC: 22 MMOL/L — SIGNIFICANT CHANGE UP (ref 22–31)
CREAT SERPL-MCNC: 0.67 MG/DL — SIGNIFICANT CHANGE UP (ref 0.5–1.3)
EOSINOPHIL # BLD AUTO: 0.1 K/UL — SIGNIFICANT CHANGE UP (ref 0–0.5)
EOSINOPHIL # BLD AUTO: 0.12 K/UL — SIGNIFICANT CHANGE UP (ref 0–0.5)
EOSINOPHIL NFR BLD AUTO: 0.7 % — SIGNIFICANT CHANGE UP (ref 0–6)
EOSINOPHIL NFR BLD AUTO: 0.9 % — SIGNIFICANT CHANGE UP (ref 0–6)
GLUCOSE SERPL-MCNC: 95 MG/DL — SIGNIFICANT CHANGE UP (ref 70–99)
HCT VFR BLD CALC: 29 % — LOW (ref 34.5–45)
HCT VFR BLD CALC: 29.9 % — LOW (ref 34.5–45)
HGB BLD-MCNC: 9.5 G/DL — LOW (ref 11.5–15.5)
HGB BLD-MCNC: 9.7 G/DL — LOW (ref 11.5–15.5)
IMM GRANULOCYTES # BLD AUTO: 0.16 # — SIGNIFICANT CHANGE UP
IMM GRANULOCYTES # BLD AUTO: 0.17 # — SIGNIFICANT CHANGE UP
IMM GRANULOCYTES NFR BLD AUTO: 1.1 % — SIGNIFICANT CHANGE UP (ref 0–1.5)
IMM GRANULOCYTES NFR BLD AUTO: 1.3 % — SIGNIFICANT CHANGE UP (ref 0–1.5)
LYMPHOCYTES # BLD AUTO: 1.83 K/UL — SIGNIFICANT CHANGE UP (ref 1–3.3)
LYMPHOCYTES # BLD AUTO: 12.4 % — LOW (ref 13–44)
LYMPHOCYTES # BLD AUTO: 15.6 % — SIGNIFICANT CHANGE UP (ref 13–44)
LYMPHOCYTES # BLD AUTO: 2.11 K/UL — SIGNIFICANT CHANGE UP (ref 1–3.3)
MCHC RBC-ENTMCNC: 28.6 PG — SIGNIFICANT CHANGE UP (ref 27–34)
MCHC RBC-ENTMCNC: 28.7 PG — SIGNIFICANT CHANGE UP (ref 27–34)
MCHC RBC-ENTMCNC: 32.4 % — SIGNIFICANT CHANGE UP (ref 32–36)
MCHC RBC-ENTMCNC: 32.8 % — SIGNIFICANT CHANGE UP (ref 32–36)
MCV RBC AUTO: 87.6 FL — SIGNIFICANT CHANGE UP (ref 80–100)
MCV RBC AUTO: 88.2 FL — SIGNIFICANT CHANGE UP (ref 80–100)
MONOCYTES # BLD AUTO: 0.83 K/UL — SIGNIFICANT CHANGE UP (ref 0–0.9)
MONOCYTES # BLD AUTO: 0.86 K/UL — SIGNIFICANT CHANGE UP (ref 0–0.9)
MONOCYTES NFR BLD AUTO: 5.8 % — SIGNIFICANT CHANGE UP (ref 2–14)
MONOCYTES NFR BLD AUTO: 6.1 % — SIGNIFICANT CHANGE UP (ref 2–14)
NEUTROPHILS # BLD AUTO: 10.27 K/UL — HIGH (ref 1.8–7.4)
NEUTROPHILS # BLD AUTO: 11.84 K/UL — HIGH (ref 1.8–7.4)
NEUTROPHILS NFR BLD AUTO: 75.9 % — SIGNIFICANT CHANGE UP (ref 43–77)
NEUTROPHILS NFR BLD AUTO: 79.9 % — HIGH (ref 43–77)
NRBC # FLD: 0 — SIGNIFICANT CHANGE UP
NRBC # FLD: 0 — SIGNIFICANT CHANGE UP
PLATELET # BLD AUTO: 403 K/UL — HIGH (ref 150–400)
PLATELET # BLD AUTO: 429 K/UL — HIGH (ref 150–400)
PMV BLD: 9.2 FL — SIGNIFICANT CHANGE UP (ref 7–13)
PMV BLD: 9.3 FL — SIGNIFICANT CHANGE UP (ref 7–13)
POTASSIUM SERPL-MCNC: 4 MMOL/L — SIGNIFICANT CHANGE UP (ref 3.5–5.3)
POTASSIUM SERPL-SCNC: 4 MMOL/L — SIGNIFICANT CHANGE UP (ref 3.5–5.3)
PROT SERPL-MCNC: 7.2 G/DL — SIGNIFICANT CHANGE UP (ref 6–8.3)
RBC # BLD: 3.31 M/UL — LOW (ref 3.8–5.2)
RBC # BLD: 3.39 M/UL — LOW (ref 3.8–5.2)
RBC # FLD: 13.2 % — SIGNIFICANT CHANGE UP (ref 10.3–14.5)
RBC # FLD: 13.3 % — SIGNIFICANT CHANGE UP (ref 10.3–14.5)
SODIUM SERPL-SCNC: 139 MMOL/L — SIGNIFICANT CHANGE UP (ref 135–145)
SPECIMEN SOURCE: SIGNIFICANT CHANGE UP
WBC # BLD: 13.53 K/UL — HIGH (ref 3.8–10.5)
WBC # BLD: 14.81 K/UL — HIGH (ref 3.8–10.5)
WBC # FLD AUTO: 13.53 K/UL — HIGH (ref 3.8–10.5)
WBC # FLD AUTO: 14.81 K/UL — HIGH (ref 3.8–10.5)

## 2018-01-26 RX ORDER — ACETAMINOPHEN 500 MG
650 TABLET ORAL ONCE
Qty: 0 | Refills: 0 | Status: DISCONTINUED | OUTPATIENT
Start: 2018-01-26 | End: 2018-01-26

## 2018-01-26 RX ORDER — VANCOMYCIN HCL 1 G
1000 VIAL (EA) INTRAVENOUS EVERY 12 HOURS
Qty: 0 | Refills: 0 | Status: DISCONTINUED | OUTPATIENT
Start: 2018-01-26 | End: 2018-01-28

## 2018-01-26 RX ORDER — SODIUM CHLORIDE 9 MG/ML
500 INJECTION, SOLUTION INTRAVENOUS ONCE
Qty: 0 | Refills: 0 | Status: COMPLETED | OUTPATIENT
Start: 2018-01-26 | End: 2018-01-26

## 2018-01-26 RX ORDER — ACETAMINOPHEN 500 MG
975 TABLET ORAL EVERY 6 HOURS
Qty: 0 | Refills: 0 | Status: DISCONTINUED | OUTPATIENT
Start: 2018-01-26 | End: 2018-01-29

## 2018-01-26 RX ORDER — MORPHINE SULFATE 50 MG/1
4 CAPSULE, EXTENDED RELEASE ORAL ONCE
Qty: 0 | Refills: 0 | Status: DISCONTINUED | OUTPATIENT
Start: 2018-01-26 | End: 2018-01-26

## 2018-01-26 RX ADMIN — SIMETHICONE 80 MILLIGRAM(S): 80 TABLET, CHEWABLE ORAL at 05:31

## 2018-01-26 RX ADMIN — SIMETHICONE 80 MILLIGRAM(S): 80 TABLET, CHEWABLE ORAL at 23:27

## 2018-01-26 RX ADMIN — Medication 250 MILLIGRAM(S): at 20:15

## 2018-01-26 RX ADMIN — SIMETHICONE 80 MILLIGRAM(S): 80 TABLET, CHEWABLE ORAL at 18:43

## 2018-01-26 RX ADMIN — Medication 975 MILLIGRAM(S): at 13:56

## 2018-01-26 RX ADMIN — Medication 1 TABLET(S): at 11:19

## 2018-01-26 RX ADMIN — SODIUM CHLORIDE 1000 MILLILITER(S): 9 INJECTION, SOLUTION INTRAVENOUS at 14:40

## 2018-01-26 RX ADMIN — PIPERACILLIN AND TAZOBACTAM 25 GRAM(S): 4; .5 INJECTION, POWDER, LYOPHILIZED, FOR SOLUTION INTRAVENOUS at 18:43

## 2018-01-26 RX ADMIN — PIPERACILLIN AND TAZOBACTAM 25 GRAM(S): 4; .5 INJECTION, POWDER, LYOPHILIZED, FOR SOLUTION INTRAVENOUS at 03:49

## 2018-01-26 RX ADMIN — MORPHINE SULFATE 4 MILLIGRAM(S): 50 CAPSULE, EXTENDED RELEASE ORAL at 07:39

## 2018-01-26 RX ADMIN — SODIUM CHLORIDE 30 MILLILITER(S): 9 INJECTION, SOLUTION INTRAVENOUS at 20:16

## 2018-01-26 RX ADMIN — HEPARIN SODIUM 5000 UNIT(S): 5000 INJECTION INTRAVENOUS; SUBCUTANEOUS at 18:43

## 2018-01-26 RX ADMIN — Medication 975 MILLIGRAM(S): at 14:26

## 2018-01-26 RX ADMIN — PIPERACILLIN AND TAZOBACTAM 25 GRAM(S): 4; .5 INJECTION, POWDER, LYOPHILIZED, FOR SOLUTION INTRAVENOUS at 11:19

## 2018-01-26 RX ADMIN — Medication 975 MILLIGRAM(S): at 20:16

## 2018-01-26 RX ADMIN — MORPHINE SULFATE 4 MILLIGRAM(S): 50 CAPSULE, EXTENDED RELEASE ORAL at 08:10

## 2018-01-26 RX ADMIN — Medication 975 MILLIGRAM(S): at 20:50

## 2018-01-26 RX ADMIN — SIMETHICONE 80 MILLIGRAM(S): 80 TABLET, CHEWABLE ORAL at 11:19

## 2018-01-26 NOTE — DISCHARGE NOTE ADULT - HOSPITAL COURSE
27 P1 a/w wound cellulitis on POD6 from [facility].  Patient received Zosyn and Vancomycin as per ID team and become afebrile on HD3.  Blood cultures showed no growth and patient was discharged in stable condition on HD3.  She willhave home wound care for her wound. 27 P1 a/w wound cellulitis on POD6 from [facility].  Patient received Zosyn and Vancomycin as per ID team and become afebrile on HD3 and her WBC decreased.  Blood cultures showed no growth and patient was discharged in stable condition on HD5 on oral antibiotics with home wound care in place.  She will follow up in one week in clinic.

## 2018-01-26 NOTE — DISCHARGE NOTE ADULT - CARE PROVIDER_API CALL
Zoe Pepe), Obstetrics and Gynecology  [address] [place], [place] [address]  [place] [address]  Phone: [phone]  Fax: [phone] Zoe Pepe), Obstetrics and Gynecology  [address] [place], [place] [id number]  [place] [address]  Phone: [phone]  Fax: [phone] Zoe Pepe), Obstetrics and Gynecology  [address] [place], [place] [address]  [place] [address]  Phone: [phone]  Fax: [phone]    Carlos Goodson; MBBS), Infectious Disease; Internal Medicine  [address] [place] [address]  Phone: [phone]  Fax: [phone]

## 2018-01-26 NOTE — DISCHARGE NOTE ADULT - PLAN OF CARE
recovery Follow up with [facility] this week, activity as tolerated, regular diet Follow up with [facility] this week, activity as tolerated, regular diet.  Call clinic if you have any fevers or abdominal pain.  You have an appointment in clinic on 2/5 (call  to confirm time).  At this time you will be given a requisition for a CT for further imaging.  Follow up with Dr. Mistry in office in ten days (call his office to make appointment)

## 2018-01-26 NOTE — PROGRESS NOTE ADULT - SUBJECTIVE AND OBJECTIVE BOX
INTERVAL HPI/OVERNIGHT EVENTS: Pt seen and examined at bedside.  T of 39.2 at 9 pm last night, otherwise no acute events.  Ambulating, passing flatus, tolerating regular diet, pain controlled with analgesia, urinating spontaneously  SHe denies nausea/vomiting/fever/chills/chest pain/SOB/dizziness/abdominal pain    MEDICATIONS  (STANDING):  heparin  Injectable 5000 Unit(s) SubCutaneous every 12 hours  lactated ringers. 1000 milliLiter(s) (30 mL/Hr) IV Continuous <Continuous>  morphine  - Injectable 4 milliGRAM(s) IV Push once  multivitamin 1 Tablet(s) Oral daily  piperacillin/tazobactam IVPB. 3.375 Gram(s) IV Intermittent every 8 hours  simethicone 80 milliGRAM(s) Chew every 6 hours    MEDICATIONS  (PRN):      12 point ROS negative except as outlined above    Vital Signs Last 24 Hrs  T(C): 37.4 (2018 05:35), Max: 39.2 (2018 21:10)  T(F): 99.4 (2018 05:35), Max: 102.5 (2018 21:10)  HR: 79 (2018 05:35) (71 - 107)  BP: 112/67 (2018 05:35) (109/67 - 124/64)  BP(mean): --  RR: 18 (2018 05:35) (16 - 18)  SpO2: 100% (2018 05:35) (96% - 100%)    I&O's Summary    2018 07:01  -  2018 07:00  --------------------------------------------------------  IN: 90 mL / OUT: 750 mL / NET: -660 mL          PHYSICAL EXAM:    GA: NAD, A+0 x 3  CV: RRR  Pulm: CTA BL  Abd: soft, nontender, nondistended, no rebound or guarding,   Incision: clean, dry and intact; steri-strips in place  Uterus: no fundal tenderness  Incision c/d/, intact besides for 2 cm open area, dressing changed without issue_  Extremities: ntbl  Lines:      LABS:                          9.4    15.21 )-----------( 388      ( 2018 04:20 )             29.5   baso 0.2    eos 0.5    imm gran 0.8    lymph 11.0   mono 5.0    poly 82.5                         10.4   16.48 )-----------( 415      ( 2018 18:23 )             31.0   baso 0.2    eos 0.4    imm gran 0.9    lymph 10.3   mono 4.2    poly 84.0           Urinalysis Basic - ( 2018 18:36 )    Color: YELLOW / Appearance: HAZY / S.018 / pH: 6.0  Gluc: NEGATIVE / Ketone: NEGATIVE  / Bili: NEGATIVE / Urobili: NORMAL mg/dL   Blood: LARGE / Protein: 30 mg/dL / Nitrite: NEGATIVE   Leuk Esterase: LARGE / RBC: >50 / WBC >50   Sq Epi: OCC / Non Sq Epi: x / Bacteria: FEW            RADIOLOGY & ADDITIONAL TESTS:

## 2018-01-26 NOTE — ADVANCED PRACTICE NURSE CONSULT - RECOMMEDATIONS
Recommend follow up care at [facility] ([phone], [address] [place] [address]).     Abdominal wound: Cleanse with NS, pat dry. Apply Liquid barrier film to periwound skin. Lightly pack wound with Aquacel AG hydrofiber, cover with foam with border. Change daily.    Please call wound care service line is further assistance is needed ([phone]).

## 2018-01-26 NOTE — ADVANCED PRACTICE NURSE CONSULT - REASON FOR CONSULT
Patient seen on skin care rounds after wound care referral received for assessment of skin impairment and recommendations of topical management. Chart reviewed: Serum albumin 3.2g/dL, serum WBC 15.21, Yusuf 21, BMI 32.3kg/m2. Patient H/O  primary  for NRFHT. As per H&P patient presenting with fever at home to 100.8F. Patient was seen in ED two days ago for pain around right side of incision. At that time, she was diagnosed with wound cellulitis and discharged with Keflex. Patient reports feeling well, and taking her temp intermittently. Admitted with cellulitis.

## 2018-01-26 NOTE — DISCHARGE NOTE ADULT - HOME CARE AGENCY
[facility]  [phone] .   Nurse to call and visit day after discharge  [facility]  [phone] .   Nurse to call and visit day after discharge [facility]  [phone] .   Nurse to call and visit day after discharge [facility]   [phone], initial visit will be next day after discharge home, a nurse will call to arrange home visit [facility]   [phone], daily wound care,  initial visit will be next day after discharge home, a nurse will call to arrange home visit

## 2018-01-26 NOTE — DISCHARGE NOTE ADULT - MEDICATION SUMMARY - MEDICATIONS TO TAKE
I will START or STAY ON the medications listed below when I get home from the hospital:    ibuprofen 600 mg oral tablet  -- 1 tab(s) by mouth every 6 hours  -- Indication: For pain I will START or STAY ON the medications listed below when I get home from the hospital:    ibuprofen 600 mg oral tablet  -- 1 tab(s) by mouth every 6 hours  -- Indication: For pain    doxycycline hyclate 100 mg oral capsule  -- 1 cap(s) by mouth 2 times a day MDD:2  -- Avoid prolonged or excessive exposure to direct and/or artificial sunlight while taking this medication.  Do not take this drug if you are pregnant.  Finish all this medication unless otherwise directed by prescriber.  Medication should be taken with plenty of water.    -- Indication: For Wound infection    Augmentin 875 mg-125 mg oral tablet  -- 1 tab(s) by mouth every 12 hours MDD:2  -- Finish all this medication unless otherwise directed by prescriber.  Take with food or milk.    -- Indication: For Wound infection

## 2018-01-26 NOTE — DISCHARGE NOTE ADULT - CONDITIONS AT DISCHARGE
Pt a+o x3, stable, afebrile, tolerating diet, voiding, ambulating and reporting no pain. Abdominal dressing CDI. Pt a+o x3, stable, afebrile, tolerating diet, voiding, ambulating and reporting no pain. Abdominal dressing CDI,changed as per DOS 1/29/18 8027

## 2018-01-26 NOTE — DISCHARGE NOTE ADULT - CARE PROVIDERS DIRECT ADDRESSES
[email or web address]

## 2018-01-26 NOTE — DISCHARGE NOTE ADULT - PATIENT PORTAL LINK FT
“You can access the Follow[facility] Patient Portal, offered by [facility], by registering with the following website: [email or web address]”

## 2018-01-27 LAB
SPECIMEN SOURCE: SIGNIFICANT CHANGE UP
SPECIMEN SOURCE: SIGNIFICANT CHANGE UP

## 2018-01-27 PROCEDURE — 99222 1ST HOSP IP/OBS MODERATE 55: CPT | Mod: GC

## 2018-01-27 RX ORDER — MORPHINE SULFATE 50 MG/1
4 CAPSULE, EXTENDED RELEASE ORAL ONCE
Qty: 0 | Refills: 0 | Status: DISCONTINUED | OUTPATIENT
Start: 2018-01-27 | End: 2018-01-29

## 2018-01-27 RX ORDER — MORPHINE SULFATE 50 MG/1
4 CAPSULE, EXTENDED RELEASE ORAL ONCE
Qty: 0 | Refills: 0 | Status: COMPLETED | OUTPATIENT
Start: 2018-01-27 | End: 2018-02-03

## 2018-01-27 RX ADMIN — PIPERACILLIN AND TAZOBACTAM 25 GRAM(S): 4; .5 INJECTION, POWDER, LYOPHILIZED, FOR SOLUTION INTRAVENOUS at 11:00

## 2018-01-27 RX ADMIN — Medication 975 MILLIGRAM(S): at 06:15

## 2018-01-27 RX ADMIN — Medication 975 MILLIGRAM(S): at 15:00

## 2018-01-27 RX ADMIN — Medication 250 MILLIGRAM(S): at 20:18

## 2018-01-27 RX ADMIN — Medication 975 MILLIGRAM(S): at 05:27

## 2018-01-27 RX ADMIN — Medication 250 MILLIGRAM(S): at 08:23

## 2018-01-27 RX ADMIN — SIMETHICONE 80 MILLIGRAM(S): 80 TABLET, CHEWABLE ORAL at 23:00

## 2018-01-27 RX ADMIN — Medication 975 MILLIGRAM(S): at 23:45

## 2018-01-27 RX ADMIN — PIPERACILLIN AND TAZOBACTAM 25 GRAM(S): 4; .5 INJECTION, POWDER, LYOPHILIZED, FOR SOLUTION INTRAVENOUS at 18:20

## 2018-01-27 RX ADMIN — Medication 975 MILLIGRAM(S): at 14:12

## 2018-01-27 RX ADMIN — SIMETHICONE 80 MILLIGRAM(S): 80 TABLET, CHEWABLE ORAL at 12:40

## 2018-01-27 RX ADMIN — HEPARIN SODIUM 5000 UNIT(S): 5000 INJECTION INTRAVENOUS; SUBCUTANEOUS at 17:35

## 2018-01-27 RX ADMIN — Medication 1 TABLET(S): at 12:40

## 2018-01-27 RX ADMIN — SIMETHICONE 80 MILLIGRAM(S): 80 TABLET, CHEWABLE ORAL at 17:36

## 2018-01-27 RX ADMIN — SIMETHICONE 80 MILLIGRAM(S): 80 TABLET, CHEWABLE ORAL at 05:26

## 2018-01-27 RX ADMIN — PIPERACILLIN AND TAZOBACTAM 25 GRAM(S): 4; .5 INJECTION, POWDER, LYOPHILIZED, FOR SOLUTION INTRAVENOUS at 04:26

## 2018-01-27 RX ADMIN — Medication 975 MILLIGRAM(S): at 22:59

## 2018-01-27 RX ADMIN — HEPARIN SODIUM 5000 UNIT(S): 5000 INJECTION INTRAVENOUS; SUBCUTANEOUS at 05:26

## 2018-01-27 NOTE — CONSULT NOTE ADULT - SUBJECTIVE AND OBJECTIVE BOX
Patient is a 27y old  Female who presents with a chief complaint of postpartum fever (2018 10:54)    HPI:    28yo  POD#12 s/p primary  for NRFHT presenting with fever at home to 100.8F. Patient was seen in ED two days ago prior to this admisison for pain around right side of incision. At that time, she was diagnosed with wound cellulitis and discharged with Keflex. CT on that ED visit was significant for L adnexal collection ( 3 x3.4) with fatty stranding likely an intrapelvic abscess. Patient reported feeling well, and taking her temp intermittently. Before coming to ED again, temps ranged from 99 to 100.3, and then 100.8 so she decided to come in. Denies sore throat, cough, chills, chest pain, SOB. No dysuria or burning.    In ED, pt was febrile, mild tachycardia, had leukocytosis up to 16. Blood and urine cultures drawn. Started on Empiric Zosyn for possible wound infection/cellulitis of the  site.     OB at [facility]      REVIEW OF SYSTEMS    General: fever intermittently    Skin/Breast: Negative for rash, pain at surgical site  	  Ophthalmologic: Negative for blurry visiion  	  ENMT:	No nose or ear discharge    Respiratory and Thorax: Negative for cough/sob  	  Cardiovascular:	Negative for chest pain    Gastrointestinal:	No abdominal pain/diarrhea    Genitourinary:	Negative for dysuria or burning, but positive for pain at the surgical site    Musculoskeletal:	No joint pains    Neurological:	No headache or loss of consciousness    Psychiatric:	No depression	      PAST MEDICAL & SURGICAL HISTORY:  Miscarriage: 2016  Post  2018      Social history:  Non smoker    FAMILY HISTORY:  No hx of premature heart disease    Allergies    No Known Allergies    Intolerances        Antimicrobials:    piperacillin/tazobactam IVPB. 3.375 Gram(s) IV Intermittent every 8 hours  vancomycin  IVPB 1000 milliGRAM(s) IV Intermittent every 12 hours        Vital Signs Last 24 Hrs  T(C): 37 (2018 02:01), Max: 39.1 (2018 14:00)  T(F): 98.6 (2018 02:01), Max: 102.4 (2018 14:00)  HR: 98 (2018 02:01) (81 - 117)  BP: 112/64 (2018 02:01) (110/63 - 142/86)  BP(mean): --  RR: 18 (2018 02:01) (18 - 18)  SpO2: 98% (2018 02:01) (98% - 100%)    PE:  Gen: Comfortable, NAD  CV: RRR, no murmurs  Pulm: CTAB  Abd: normoactive bowel sounds, soft, nontender  Incision: Pfannenstiel skin incision, erythema ~4cm above incision, +induration along the length, ~2cm skin opening on right edge of incision, probed to 4cm deep, fascia intact. No fluctuance. Wound packed with iodoform  Ext: No edema or tenderness bilaterally  Pelvic exam deferred               LABORATORY               9.5    14.81 )-----------( 429      ( 2018 15:46 )             29.0           139  |  103  |  9   ----------------------------<  95  4.0   |  22  |  0.67    Ca    8.4      2018 15:46    TPro  7.2  /  Alb  3.0<L>  /  TBili  0.2  /  DBili  x   /  AST  13  /  ALT  13  /  AlkPhos  131<H>        RECENT CULTURES:   @ 20:32  BLOOD  NGTD    NO ORGANISMS ISOLATED  NO ORGANISMS ISOLATED AT 48 HRS.   @ 19:01  URINE MIDSTREAM  NGTD    RADIOLOGY & ADDITIONAL STUDIES: CT Abdomen and Pelvis w/ IV Cont (18 @ 03:57) >  LOWER CHEST: Within normal limits.  LIVER: Within normal limits.  BILE DUCTS: Normal caliber.  GALLBLADDER: Within normal limits.  SPLEEN: Within normal limits.  PANCREAS: Within normal limits.  ADRENALS: Within normal limits.  KIDNEYS/URETERS: Within normal limits.  BLADDER: Within normal limits.  REPRODUCTIVE ORGANS: Enlarged uterus with focal area of decreased   enhancement predominantly in the posterior wall (601-78). There is 3 x   3.4 cm sized rim-enhancing fluid collection with extensive surrounding   fat stranding in the left adnexal area (2-97).  BOWEL: No bowel obstruction. Appendix      PERITONEUM: No ascites.  VESSELS:  Within normal limits.  RETROPERITONEUM: No lymphadenopathy.    ABDOMINAL WALL: Fat stranding with subcutaneous air associated with   recent ..  BONES: Within normal limits.  IMPRESSION: Intrapelvic abscess with extensive fat stranding. (2018 20:52) Patient is a 27y old  Female who presents with a chief complaint of postpartum fever (2018 10:54)    HPI:    26yo  POD#11 s/p primary  for NRFHT presenting with fever at home to 100.8F. Patient was seen in ED two days ago prior to this admisison for pain around right side of incision. At that time, she was diagnosed with wound cellulitis and discharged with Keflex. CT on that ED visit was significant for L adnexal collection ( 3 x3.4) with fatty stranding likely an intrapelvic abscess. Patient reported feeling well, and taking her temp intermittently. Before coming to ED again, temps ranged from 99 to 100.3, and then 100.8 so she decided to come in. Denies sore throat, cough, chills, chest pain, SOB. No dysuria or burning.    In ED, pt was febrile, mild tachycardia, had leukocytosis up to 16. Blood and urine cultures drawn. Started on Empiric Zosyn for possible wound infection/cellulitis of the  site.     OB at [facility]      REVIEW OF SYSTEMS    General: fever intermittently    Skin/Breast: Negative for rash, pain at surgical site  	  Ophthalmologic: Negative for blurry visiion  	  ENMT:	No nose or ear discharge    Respiratory and Thorax: Negative for cough/sob  	  Cardiovascular:	Negative for chest pain    Gastrointestinal:	No abdominal pain/diarrhea    Genitourinary:	Negative for dysuria or burning, but positive for pain at the surgical site    Musculoskeletal:	No joint pains    Neurological:	No headache or loss of consciousness    Psychiatric:	No depression	      PAST MEDICAL & SURGICAL HISTORY:  Miscarriage: 2016  Post  2018      Social history:  Non smoker    FAMILY HISTORY:  No hx of premature heart disease    Allergies    No Known Allergies    Intolerances        Antimicrobials:    piperacillin/tazobactam IVPB. 3.375 Gram(s) IV Intermittent every 8 hours  vancomycin  IVPB 1000 milliGRAM(s) IV Intermittent every 12 hours        Vital Signs Last 24 Hrs  T(C): 37 (2018 02:01), Max: 39.1 (2018 14:00)  T(F): 98.6 (2018 02:01), Max: 102.4 (2018 14:00)  HR: 98 (2018 02:01) (81 - 117)  BP: 112/64 (2018 02:01) (110/63 - 142/86)  BP(mean): --  RR: 18 (2018 02:01) (18 - 18)  SpO2: 98% (2018 02:01) (98% - 100%)    PE:  Gen: Comfortable, NAD  CV: RRR, no murmurs  Pulm: CTAB  Abd: normoactive bowel sounds, soft, nontender  Incision: Pfannenstiel skin incision, erythema ~4cm above incision, +induration along the length, ~2cm skin opening on right edge of incision, probed to 4cm deep, fascia intact. No fluctuance. Wound packed with iodoform  Ext: No edema or tenderness bilaterally  Pelvic exam deferred               LABORATORY               9.5    14.81 )-----------( 429      ( 2018 15:46 )             29.0           139  |  103  |  9   ----------------------------<  95  4.0   |  22  |  0.67    Ca    8.4      2018 15:46    TPro  7.2  /  Alb  3.0<L>  /  TBili  0.2  /  DBili  x   /  AST  13  /  ALT  13  /  AlkPhos  131<H>        RECENT CULTURES:   @ 20:32  BLOOD  NGTD    NO ORGANISMS ISOLATED  NO ORGANISMS ISOLATED AT 48 HRS.   @ 19:01  URINE MIDSTREAM  NGTD    RADIOLOGY & ADDITIONAL STUDIES: CT Abdomen and Pelvis w/ IV Cont (18 @ 03:57) >  LOWER CHEST: Within normal limits.  LIVER: Within normal limits.  BILE DUCTS: Normal caliber.  GALLBLADDER: Within normal limits.  SPLEEN: Within normal limits.  PANCREAS: Within normal limits.  ADRENALS: Within normal limits.  KIDNEYS/URETERS: Within normal limits.  BLADDER: Within normal limits.  REPRODUCTIVE ORGANS: Enlarged uterus with focal area of decreased   enhancement predominantly in the posterior wall (601-78). There is 3 x   3.4 cm sized rim-enhancing fluid collection with extensive surrounding   fat stranding in the left adnexal area (2-97).  BOWEL: No bowel obstruction. Appendix      PERITONEUM: No ascites.  VESSELS:  Within normal limits.  RETROPERITONEUM: No lymphadenopathy.    ABDOMINAL WALL: Fat stranding with subcutaneous air associated with   recent ..  BONES: Within normal limits.  IMPRESSION: Intrapelvic abscess with extensive fat stranding. (2018 20:52) Patient is a 27y old  Female who presents with a chief complaint of postpartum fever (2018 10:54)    HPI:    26yo  POD#11 s/p primary  for NRFHT presenting with fever at home to 100.8F. Patient was seen in ED two days ago prior to this admisison for pain around right side of incision. At that time, she was diagnosed with wound cellulitis and discharged with Keflex. CT on that ED visit was significant for L adnexal collection ( 3 x3.4) with fatty stranding likely an intrapelvic abscess. Patient reported feeling well, and taking her temp intermittently. Before coming to ED again, temps ranged from 99 to 100.3, and then 100.8 so she decided to come in. Denies sore throat, cough, chills, chest pain, SOB. No dysuria or burning.    In ED, pt was febrile, mild tachycardia, had leukocytosis up to 16. Blood and urine cultures drawn. Started on Empiric Zosyn for possible wound infection/cellulitis of the  site.     OB at [facility]      REVIEW OF SYSTEMS    General: fever intermittently    Skin/Breast: Negative for rash, pain at surgical site  	  Ophthalmologic: Negative for blurry visiion  	  ENMT:	No nose or ear discharge    Respiratory and Thorax: Negative for cough/sob  	  Cardiovascular:	Negative for chest pain    Gastrointestinal:	No abdominal pain/diarrhea    Genitourinary:	Negative for dysuria or burning, but positive for pain at the surgical site    Musculoskeletal:	No joint pains    Neurological:	No headache or loss of consciousness    Psychiatric:	No depression	      PAST MEDICAL & SURGICAL HISTORY:  Miscarriage: 2016  Post  2018      Social history:  Non smoker  Originally from [place], came to [place] when she was 1 year old    FAMILY HISTORY:  No hx of premature heart disease    Allergies    No Known Allergies    Intolerances        Antimicrobials:    piperacillin/tazobactam IVPB. 3.375 Gram(s) IV Intermittent every 8 hours  vancomycin  IVPB 1000 milliGRAM(s) IV Intermittent every 12 hours        Vital Signs Last 24 Hrs  T(C): 37 (2018 02:01), Max: 39.1 (2018 14:00)  T(F): 98.6 (2018 02:01), Max: 102.4 (2018 14:00)  HR: 98 (2018 02:01) (81 - 117)  BP: 112/64 (2018 02:01) (110/63 - 142/86)  BP(mean): --  RR: 18 (2018 02:01) (18 - 18)  SpO2: 98% (2018 02:01) (98% - 100%)    PE:  Gen: Comfortable, NAD  CV: RRR, no murmurs  Pulm: CTAB  Abd: normoactive bowel sounds, soft, nontender  Incision: Pfannenstiel skin incision, erythema ~4cm above incision, +induration along the length, ~2cm skin opening on right edge of incision, (Per OB--probed to 4cm deep, fascia intact). No fluctuance. Wound packed with iodoform  Ext: No edema or tenderness bilaterally  Pelvic exam deferred               LABORATORY               9.5    14.81 )-----------( 429      ( 2018 15:46 )             29.0           139  |  103  |  9   ----------------------------<  95  4.0   |  22  |  0.67    Ca    8.4      2018 15:46    TPro  7.2  /  Alb  3.0<L>  /  TBili  0.2  /  DBili  x   /  AST  13  /  ALT  13  /  AlkPhos  131<H>        RECENT CULTURES:   @ 20:32  BLOOD  NGTD    NO ORGANISMS ISOLATED  NO ORGANISMS ISOLATED AT 48 HRS.   @ 19:01  URINE MIDSTREAM  NGTD    RADIOLOGY & ADDITIONAL STUDIES: CT Abdomen and Pelvis w/ IV Cont (18 @ 03:57) >  LOWER CHEST: Within normal limits.  LIVER: Within normal limits.  BILE DUCTS: Normal caliber.  GALLBLADDER: Within normal limits.  SPLEEN: Within normal limits.  PANCREAS: Within normal limits.  ADRENALS: Within normal limits.  KIDNEYS/URETERS: Within normal limits.  BLADDER: Within normal limits.  REPRODUCTIVE ORGANS: Enlarged uterus with focal area of decreased   enhancement predominantly in the posterior wall (601-78). There is 3 x   3.4 cm sized rim-enhancing fluid collection with extensive surrounding   fat stranding in the left adnexal area (2-97).  BOWEL: No bowel obstruction. Appendix      PERITONEUM: No ascites.  VESSELS:  Within normal limits.  RETROPERITONEUM: No lymphadenopathy.    ABDOMINAL WALL: Fat stranding with subcutaneous air associated with   recent ..  BONES: Within normal limits.  IMPRESSION: Intrapelvic abscess with extensive fat stranding. (2018 20:52)

## 2018-01-27 NOTE — CONSULT NOTE ADULT - ATTENDING COMMENTS
27F s/p  with postop wound infection/cellulitis, fever, leukocytosis, sepsis, abnormal CT abd/pelvis  -vanco added overnight - increase vanco 1.25 gm iv q12  -zosyn 3.375 gm iv q8  -Cx -ve  -if fevers persist, repeat CT abd to reassess adnexal collection   -wound seems better    Carlos Goodson  Attending Physician   Division of Infectious Disease  Pager #[phone]  After 5pm/weekend or no response, call #[phone]

## 2018-01-27 NOTE — CONSULT NOTE ADULT - ASSESSMENT
28yo  POD#12 s/p primary  for NRFHT presenting with fever, found to have fever, leukocytosis and Ct evidence of L adnexal collection likely an abscess. Being managed for cellulitis of the  site along with intrapelvics abscess. Has not gone for the drainage of the collection. Remains intermittently febrile, with negative cultures so far.     Recommend  --Fu blood and urine cultures--NGTD  --Continue with Vancomycin 1g q12 and Zosyn 3.378 q8 for now  --If continues to be be febrile would do a a repeat scan and consider for IR drainage of the pelvis abscess 28yo  POD#11 s/p primary  for NRFHT presenting with fever and incision suite pain/redness, found to have fever, leukocytosis and Ct evidence of L adnexal collection likely an abscess. Being managed for cellulitis of the  site along with intrapelvic abscess. Has not gone for the drainage of the collection. Remains intermittently febrile, with negative cultures so far.     Recommend  --Fu blood and urine cultures--NGTD  --Continue with Vancomycin 1g q12 and Zosyn 3.378 q8 for now  --If continues to be be febrile would do a a repeat scan and consider for IR drainage of the pelvis abscess 26yo  POD#11 s/p primary  for NRFHT presenting with fever and incision suite pain/redness, found to have fever, leukocytosis and Ct evidence of L adnexal collection likely an abscess. Being managed for cellulitis of the  site along with intrapelvic abscess. Has not gone for the drainage of the collection. Remains intermittently febrile, with negative cultures so far.     Recommend  --Fu blood and urine cultures--NGTD  --Continue with Vancomycin and Zosyn 3.378 q8 for now  --Increase the dose of vancomycin to 1500mg q12 IV  --If continues to be be febrile would do a a repeat scan and consider for IR drainage of the pelvis abscess 28yo  POD#11 s/p primary  for NRFHT presenting with fever and incision suite pain/redness, found to have fever, leukocytosis and Ct evidence of L adnexal collection likely an abscess. Being managed for cellulitis of the  site along with intrapelvic abscess. Has not gone for the drainage of the collection. Remains intermittently febrile, with negative cultures so far.     Recommend  --Fu blood and urine cultures--NGTD  --Continue with Vancomycin and Zosyn 3.378 q8 for now  --Increase the dose of vancomycin to 1500mg q12 IV  --If continues to be be febrile would do a repeat scan and consider for IR drainage of the pelvic abscess

## 2018-01-28 LAB
BASOPHILS # BLD AUTO: 0.03 K/UL — SIGNIFICANT CHANGE UP (ref 0–0.2)
BASOPHILS NFR BLD AUTO: 0.3 % — SIGNIFICANT CHANGE UP (ref 0–2)
EOSINOPHIL # BLD AUTO: 0.3 K/UL — SIGNIFICANT CHANGE UP (ref 0–0.5)
EOSINOPHIL NFR BLD AUTO: 3.1 % — SIGNIFICANT CHANGE UP (ref 0–6)
HCT VFR BLD CALC: 28.6 % — LOW (ref 34.5–45)
HGB BLD-MCNC: 9.2 G/DL — LOW (ref 11.5–15.5)
IMM GRANULOCYTES # BLD AUTO: 0.21 # — SIGNIFICANT CHANGE UP
IMM GRANULOCYTES NFR BLD AUTO: 2.2 % — HIGH (ref 0–1.5)
LYMPHOCYTES # BLD AUTO: 1.83 K/UL — SIGNIFICANT CHANGE UP (ref 1–3.3)
LYMPHOCYTES # BLD AUTO: 19.2 % — SIGNIFICANT CHANGE UP (ref 13–44)
MCHC RBC-ENTMCNC: 28.8 PG — SIGNIFICANT CHANGE UP (ref 27–34)
MCHC RBC-ENTMCNC: 32.2 % — SIGNIFICANT CHANGE UP (ref 32–36)
MCV RBC AUTO: 89.4 FL — SIGNIFICANT CHANGE UP (ref 80–100)
MONOCYTES # BLD AUTO: 0.67 K/UL — SIGNIFICANT CHANGE UP (ref 0–0.9)
MONOCYTES NFR BLD AUTO: 7 % — SIGNIFICANT CHANGE UP (ref 2–14)
NEUTROPHILS # BLD AUTO: 6.49 K/UL — SIGNIFICANT CHANGE UP (ref 1.8–7.4)
NEUTROPHILS NFR BLD AUTO: 68.2 % — SIGNIFICANT CHANGE UP (ref 43–77)
NRBC # FLD: 0 — SIGNIFICANT CHANGE UP
PLATELET # BLD AUTO: 433 K/UL — HIGH (ref 150–400)
PMV BLD: 9.2 FL — SIGNIFICANT CHANGE UP (ref 7–13)
RBC # BLD: 3.2 M/UL — LOW (ref 3.8–5.2)
RBC # FLD: 13.3 % — SIGNIFICANT CHANGE UP (ref 10.3–14.5)
VANCOMYCIN TROUGH SERPL-MCNC: 8.6 UG/ML — LOW (ref 10–20)
WBC # BLD: 9.53 K/UL — SIGNIFICANT CHANGE UP (ref 3.8–10.5)
WBC # FLD AUTO: 9.53 K/UL — SIGNIFICANT CHANGE UP (ref 3.8–10.5)

## 2018-01-28 PROCEDURE — 99232 SBSQ HOSP IP/OBS MODERATE 35: CPT | Mod: GC

## 2018-01-28 RX ORDER — VANCOMYCIN HCL 1 G
1250 VIAL (EA) INTRAVENOUS EVERY 12 HOURS
Qty: 0 | Refills: 0 | Status: DISCONTINUED | OUTPATIENT
Start: 2018-01-28 | End: 2018-01-28

## 2018-01-28 RX ORDER — BENZOCAINE AND MENTHOL 5; 1 G/100ML; G/100ML
1 LIQUID ORAL DAILY
Qty: 0 | Refills: 0 | Status: DISCONTINUED | OUTPATIENT
Start: 2018-01-28 | End: 2018-01-29

## 2018-01-28 RX ADMIN — Medication 100 MILLIGRAM(S): at 18:20

## 2018-01-28 RX ADMIN — BENZOCAINE AND MENTHOL 1 LOZENGE: 5; 1 LIQUID ORAL at 03:41

## 2018-01-28 RX ADMIN — PIPERACILLIN AND TAZOBACTAM 25 GRAM(S): 4; .5 INJECTION, POWDER, LYOPHILIZED, FOR SOLUTION INTRAVENOUS at 11:18

## 2018-01-28 RX ADMIN — Medication 1 TABLET(S): at 18:20

## 2018-01-28 RX ADMIN — SIMETHICONE 80 MILLIGRAM(S): 80 TABLET, CHEWABLE ORAL at 11:18

## 2018-01-28 RX ADMIN — SIMETHICONE 80 MILLIGRAM(S): 80 TABLET, CHEWABLE ORAL at 17:50

## 2018-01-28 RX ADMIN — SIMETHICONE 80 MILLIGRAM(S): 80 TABLET, CHEWABLE ORAL at 23:31

## 2018-01-28 RX ADMIN — PIPERACILLIN AND TAZOBACTAM 25 GRAM(S): 4; .5 INJECTION, POWDER, LYOPHILIZED, FOR SOLUTION INTRAVENOUS at 02:56

## 2018-01-28 RX ADMIN — Medication 975 MILLIGRAM(S): at 21:38

## 2018-01-28 RX ADMIN — HEPARIN SODIUM 5000 UNIT(S): 5000 INJECTION INTRAVENOUS; SUBCUTANEOUS at 06:09

## 2018-01-28 RX ADMIN — Medication 1 TABLET(S): at 11:18

## 2018-01-28 RX ADMIN — Medication 975 MILLIGRAM(S): at 21:54

## 2018-01-28 RX ADMIN — Medication 975 MILLIGRAM(S): at 12:19

## 2018-01-28 RX ADMIN — SIMETHICONE 80 MILLIGRAM(S): 80 TABLET, CHEWABLE ORAL at 06:09

## 2018-01-28 RX ADMIN — HEPARIN SODIUM 5000 UNIT(S): 5000 INJECTION INTRAVENOUS; SUBCUTANEOUS at 17:50

## 2018-01-28 RX ADMIN — Medication 975 MILLIGRAM(S): at 11:35

## 2018-01-28 RX ADMIN — Medication 166.67 MILLIGRAM(S): at 08:37

## 2018-01-28 NOTE — PROGRESS NOTE ADULT - SUBJECTIVE AND OBJECTIVE BOX
Patient is a 27y old  Female who presents with a chief complaint of postpartum fever (2018 10:54)    INTERVAL HISTORY: doing well, denies any abdominal pain. Afebrile > 40 hours now, cultures have remained negative.       REVIEW OF SYSTEMS    General: fever intermittently    Skin/Breast: Negative for rash, pain at surgical site  	  Ophthalmologic: Negative for blurry vision  	  ENMT:	No nose or ear discharge    Respiratory and Thorax: Negative for cough/sob  	  Cardiovascular:	Negative for chest pain    Gastrointestinal:	No abdominal pain/diarrhea    Genitourinary:	Negative for dysuria or burning, but positive for pain at the surgical site    Musculoskeletal:	No joint pains    Neurological:	No headache or loss of consciousness    Psychiatric:	No depression	          Antimicrobials:    piperacillin/tazobactam IVPB. 3.375 Gram(s) IV Intermittent every 8 hours  vancomycin  IVPB 1250 milliGRAM(s) IV Intermittent every 12 hours        Vital Signs Last 24 Hrs  T(C): 36.8 (2018 09:56), Max: 37.4 (2018 00:58)  T(F): 98.3 (2018 09:56), Max: 99.4 (2018 00:58)  HR: 117 (2018 09:56) (87 - 117)  BP: 120/62 (2018 09:56) (97/57 - 120/62)  BP(mean): --  RR: 17 (2018 09:56) (17 - 18)  SpO2: 100% (2018 09:56) (99% - 100%)  PE:  Gen: Comfortable, NAD  CV: RRR, no murmurs  Pulm: CTAB  Abd: normoactive bowel sounds, soft, nontender  Incision: Pfannenstiel skin incision, erythema ~4cm above incision, +induration along the length, ~2cm skin opening on right edge of incision, (Per OB--probed to 4cm deep, fascia intact). No fluctuance. Wound packed with iodoform  Ext: No edema or tenderness bilaterally  Pelvic exam deferred               LABORATORY                          9.2    9.53  )-----------( 433      ( 2018 04:30 )             28.6     01-    139  |  103  |  9   ----------------------------<  95  4.0   |  22  |  0.67    Ca    8.4      2018 15:46    TPro  7.2  /  Alb  3.0<L>  /  TBili  0.2  /  DBili  x   /  AST  13  /  ALT  13  /  AlkPhos  131<H>        RECENT CULTURES:   @ 20:32  BLOOD  NGTD    NO ORGANISMS ISOLATED  NO ORGANISMS ISOLATED AT 48 HRS.   @ 19:01  URINE MIDSTREAM  NGTD    Vancomycin Level, Trough (18 @ 04:30)  Vancomycin Level, Trough: 8.6: Vancomycin trough levels should be rapidly reached and  maintained at 15-20 ug/ml for life threatening MRSA  infections such as sepsis, endocarditis, osteomyelitis and  pneumonia. A first trough level should be drawn before the  3rd or 4th dose. Riskof renal toxicity is increased for  levels >15 ug/mL, in patients on other nephrotoxic drugs,  who are hemodynamically unstable, have unstable renal  function, or are on vancomycin therapy for >14 days. Renal  function with creatinine levels should be monitored for  those patients. ug/mL        RADIOLOGY & ADDITIONAL STUDIES: CT Abdomen and Pelvis w/ IV Cont (18 @ 03:57) >  LOWER CHEST: Within normal limits.  LIVER: Within normal limits.  BILE DUCTS: Normal caliber.  GALLBLADDER: Within normal limits.  SPLEEN: Within normal limits.  PANCREAS: Within normal limits.  ADRENALS: Within normal limits.  KIDNEYS/URETERS: Within normal limits.  BLADDER: Within normal limits.  REPRODUCTIVE ORGANS: Enlarged uterus with focal area of decreased   enhancement predominantly in the posterior wall (601-78). There is 3 x   3.4 cm sized rim-enhancing fluid collection with extensive surrounding   fat stranding in the left adnexal area (2-97).  BOWEL: No bowel obstruction. Appendix      PERITONEUM: No ascites.  VESSELS:  Within normal limits.  RETROPERITONEUM: No lymphadenopathy.    ABDOMINAL WALL: Fat stranding with subcutaneous air associated with   recent ..  BONES: Within normal limits.  IMPRESSION: Intrapelvic abscess with extensive fat stranding. (2018 20:52)

## 2018-01-28 NOTE — PROGRESS NOTE ADULT - SUBJECTIVE AND OBJECTIVE BOX
INTERVAL HPI/OVERNIGHT EVENTS: Pt seen and examined at bedside.   Ambulating, passing flatus, tolerating regular diet, pain controlled with analgesia, urinating spontaneously  SHe denies nausea/vomiting/fever/chills/chest pain/SOB/dizziness.    MEDICATIONS  (STANDING):  benzocaine 15 mG/menthol 3.6 mG Lozenge 1 Lozenge Oral daily  heparin  Injectable 5000 Unit(s) SubCutaneous every 12 hours  lactated ringers. 1000 milliLiter(s) (30 mL/Hr) IV Continuous <Continuous>  morphine  - Injectable 4 milliGRAM(s) IV Push once  multivitamin 1 Tablet(s) Oral daily  piperacillin/tazobactam IVPB. 3.375 Gram(s) IV Intermittent every 8 hours  simethicone 80 milliGRAM(s) Chew every 6 hours  vancomycin  IVPB 1000 milliGRAM(s) IV Intermittent every 12 hours    MEDICATIONS  (PRN):  acetaminophen   Tablet. 975 milliGRAM(s) Oral every 6 hours PRN Mild Pain (1 - 3)      12 point ROS negative except as outlined above    Vital Signs Last 24 Hrs  T(C): 36.9 (28 Jan 2018 04:55), Max: 37.4 (28 Jan 2018 00:58)  T(F): 98.5 (28 Jan 2018 04:55), Max: 99.4 (28 Jan 2018 00:58)  HR: 99 (28 Jan 2018 04:55) (87 - 101)  BP: 99/62 (28 Jan 2018 04:55) (97/57 - 114/64)  BP(mean): --  RR: 17 (28 Jan 2018 04:55) (17 - 18)  SpO2: 99% (28 Jan 2018 04:55) (99% - 100%)    I&O's Summary    27 Jan 2018 07:01  -  28 Jan 2018 05:46  --------------------------------------------------------  IN: 830 mL / OUT: 800 mL / NET: 30 mL          PHYSICAL EXAM:    GA: NAD, A+0 x 3  CV: RRR  Pulm: CTA BL  Abd: BS, soft, nontender, nondistended, no rebound or guarding,   Incision: clean, dry and intact;  Uterus: Fundus midline; firm   Extremities: ntbl  Lines:      LABS:                          9.2    9.53  )-----------( 433      ( 28 Jan 2018 04:30 )             28.6   baso 0.3    eos 3.1    imm gran 2.2    lymph 19.2   mono 7.0    poly 68.2                         9.5    14.81 )-----------( 429      ( 26 Jan 2018 15:46 )             29.0   baso 0.1    eos 0.7    imm gran 1.1    lymph 12.4   mono 5.8    poly 79.9                         9.7    13.53 )-----------( 403      ( 26 Jan 2018 08:02 )             29.9   baso 0.2    eos 0.9    imm gran 1.3    lymph 15.6   mono 6.1    poly 75.9                   RADIOLOGY & ADDITIONAL TESTS:

## 2018-01-28 NOTE — PROVIDER CONTACT NOTE (OTHER) - ACTION/TREATMENT ORDERED:
MD Leslie made aware. Pt given tylenol for pain as ordered. Labs ordered. Will continue to monitor. Call bell within reach.
MD will assess. RN will continue to monitor
continue to monitor,  awaiting Tylenol order
continue to monitor, no new orders at this time

## 2018-01-28 NOTE — PROVIDER CONTACT NOTE (OTHER) - ASSESSMENT
Pt observed resting comfortably in bed. Pt's VSS. Pt is A+OX4. Pt c/o 6/10 pain in abd.
HR: 107, T: 102.5 denies chest pain and SOB
HR: 113, denies chest pain and SOB
see flow sheet

## 2018-01-29 VITALS
HEART RATE: 87 BPM | SYSTOLIC BLOOD PRESSURE: 101 MMHG | DIASTOLIC BLOOD PRESSURE: 69 MMHG | RESPIRATION RATE: 17 BRPM | TEMPERATURE: 98 F | OXYGEN SATURATION: 99 %

## 2018-01-29 DIAGNOSIS — L03.90 CELLULITIS, UNSPECIFIED: ICD-10-CM

## 2018-01-29 PROBLEM — Z00.00 ENCOUNTER FOR PREVENTIVE HEALTH EXAMINATION: Status: ACTIVE | Noted: 2018-01-29

## 2018-01-29 LAB
BACTERIA BLD CULT: SIGNIFICANT CHANGE UP
BACTERIA BLD CULT: SIGNIFICANT CHANGE UP
BASOPHILS # BLD AUTO: 0.05 K/UL — SIGNIFICANT CHANGE UP (ref 0–0.2)
BASOPHILS NFR BLD AUTO: 0.6 % — SIGNIFICANT CHANGE UP (ref 0–2)
EOSINOPHIL # BLD AUTO: 0.34 K/UL — SIGNIFICANT CHANGE UP (ref 0–0.5)
EOSINOPHIL NFR BLD AUTO: 4.2 % — SIGNIFICANT CHANGE UP (ref 0–6)
HCT VFR BLD CALC: 30.4 % — LOW (ref 34.5–45)
HGB BLD-MCNC: 9.8 G/DL — LOW (ref 11.5–15.5)
IMM GRANULOCYTES # BLD AUTO: 0.32 # — SIGNIFICANT CHANGE UP
IMM GRANULOCYTES NFR BLD AUTO: 3.9 % — HIGH (ref 0–1.5)
LYMPHOCYTES # BLD AUTO: 2 K/UL — SIGNIFICANT CHANGE UP (ref 1–3.3)
LYMPHOCYTES # BLD AUTO: 24.7 % — SIGNIFICANT CHANGE UP (ref 13–44)
MCHC RBC-ENTMCNC: 28.7 PG — SIGNIFICANT CHANGE UP (ref 27–34)
MCHC RBC-ENTMCNC: 32.2 % — SIGNIFICANT CHANGE UP (ref 32–36)
MCV RBC AUTO: 89.1 FL — SIGNIFICANT CHANGE UP (ref 80–100)
MONOCYTES # BLD AUTO: 0.69 K/UL — SIGNIFICANT CHANGE UP (ref 0–0.9)
MONOCYTES NFR BLD AUTO: 8.5 % — SIGNIFICANT CHANGE UP (ref 2–14)
NEUTROPHILS # BLD AUTO: 4.71 K/UL — SIGNIFICANT CHANGE UP (ref 1.8–7.4)
NEUTROPHILS NFR BLD AUTO: 58.1 % — SIGNIFICANT CHANGE UP (ref 43–77)
NRBC # FLD: 0 — SIGNIFICANT CHANGE UP
PLATELET # BLD AUTO: 474 K/UL — HIGH (ref 150–400)
PMV BLD: 9.4 FL — SIGNIFICANT CHANGE UP (ref 7–13)
RBC # BLD: 3.41 M/UL — LOW (ref 3.8–5.2)
RBC # FLD: 13.2 % — SIGNIFICANT CHANGE UP (ref 10.3–14.5)
WBC # BLD: 8.11 K/UL — SIGNIFICANT CHANGE UP (ref 3.8–10.5)
WBC # FLD AUTO: 8.11 K/UL — SIGNIFICANT CHANGE UP (ref 3.8–10.5)

## 2018-01-29 PROCEDURE — 99232 SBSQ HOSP IP/OBS MODERATE 35: CPT | Mod: GC

## 2018-01-29 PROCEDURE — 99232 SBSQ HOSP IP/OBS MODERATE 35: CPT

## 2018-01-29 RX ADMIN — Medication 975 MILLIGRAM(S): at 05:48

## 2018-01-29 RX ADMIN — SIMETHICONE 80 MILLIGRAM(S): 80 TABLET, CHEWABLE ORAL at 05:48

## 2018-01-29 RX ADMIN — Medication 100 MILLIGRAM(S): at 05:49

## 2018-01-29 RX ADMIN — Medication 1 TABLET(S): at 05:49

## 2018-01-29 RX ADMIN — Medication 975 MILLIGRAM(S): at 06:41

## 2018-01-29 RX ADMIN — BENZOCAINE AND MENTHOL 1 LOZENGE: 5; 1 LIQUID ORAL at 12:14

## 2018-01-29 RX ADMIN — Medication 1 TABLET(S): at 12:14

## 2018-01-29 NOTE — PROGRESS NOTE ADULT - SUBJECTIVE AND OBJECTIVE BOX
INTERVAL HPI/OVERNIGHT EVENTS: Pt seen and examined at bedside. No overnight events.  Ambulating, passing flatus, tolerating regular diet, pain controlled with analgesia, urinating spontaneously  SHe denies nausea/vomiting/fever/chills/chest pain/SOB/dizziness.    MEDICATIONS  (STANDING):  amoxicillin  875 milliGRAM(s)/clavulanate 1 Tablet(s) Oral every 12 hours  benzocaine 15 mG/menthol 3.6 mG Lozenge 1 Lozenge Oral daily  doxycycline hyclate Capsule 100 milliGRAM(s) Oral every 12 hours  heparin  Injectable 5000 Unit(s) SubCutaneous every 12 hours  morphine  - Injectable 4 milliGRAM(s) IV Push once  multivitamin 1 Tablet(s) Oral daily  simethicone 80 milliGRAM(s) Chew every 6 hours    MEDICATIONS  (PRN):  acetaminophen   Tablet. 975 milliGRAM(s) Oral every 6 hours PRN Mild Pain (1 - 3)      12 point ROS negative except as outlined above    Vital Signs Last 24 Hrs  T(C): 36.8 (29 Jan 2018 05:46), Max: 37.3 (28 Jan 2018 18:26)  T(F): 98.3 (29 Jan 2018 05:46), Max: 99.2 (28 Jan 2018 18:26)  HR: 87 (29 Jan 2018 05:46) (80 - 117)  BP: 101/69 (29 Jan 2018 05:46) (98/57 - 120/62)  BP(mean): --  RR: 17 (29 Jan 2018 05:46) (17 - 18)  SpO2: 99% (29 Jan 2018 05:46) (99% - 100%)    I&O's Summary        PHYSICAL EXAM:    GA: NAD, A+0 x 3  CV: RRR  Pulm: CTA BL  Abd:  BS, soft, nontender, nondistended, no rebound or guarding,   Incision: clean, dry and intact; steri-strips in place  Uterus: Fundus midline; firm at ___  fb below umbilicus  : lochia WNL; perineum: ____  Extremities: no swelling or calf tenderness, reflexes +2 bilaterally  Lines:      LABS:                          9.2    9.53  )-----------( 433      ( 28 Jan 2018 04:30 )             28.6   baso 0.3    eos 3.1    imm gran 2.2    lymph 19.2   mono 7.0    poly 68.2                         9.5    14.81 )-----------( 429      ( 26 Jan 2018 15:46 )             29.0   baso 0.1    eos 0.7    imm gran 1.1    lymph 12.4   mono 5.8    poly 79.9                         9.7    13.53 )-----------( 403      ( 26 Jan 2018 08:02 )             29.9   baso 0.2    eos 0.9    imm gran 1.3    lymph 15.6   mono 6.1    poly 75.9                   RADIOLOGY & ADDITIONAL TESTS: INTERVAL HPI/OVERNIGHT EVENTS: Pt seen and examined at bedside. No overnight events.  Ambulating, passing flatus, tolerating regular diet, pain controlled with analgesia, urinating spontaneously  SHe denies nausea/vomiting/fever/chills/chest pain/SOB/dizziness.    MEDICATIONS  (STANDING):  amoxicillin  875 milliGRAM(s)/clavulanate 1 Tablet(s) Oral every 12 hours  benzocaine 15 mG/menthol 3.6 mG Lozenge 1 Lozenge Oral daily  doxycycline hyclate Capsule 100 milliGRAM(s) Oral every 12 hours  heparin  Injectable 5000 Unit(s) SubCutaneous every 12 hours  morphine  - Injectable 4 milliGRAM(s) IV Push once  multivitamin 1 Tablet(s) Oral daily  simethicone 80 milliGRAM(s) Chew every 6 hours    MEDICATIONS  (PRN):  acetaminophen   Tablet. 975 milliGRAM(s) Oral every 6 hours PRN Mild Pain (1 - 3)      12 point ROS negative except as outlined above    Vital Signs Last 24 Hrs  T(C): 36.8 (29 Jan 2018 05:46), Max: 37.3 (28 Jan 2018 18:26)  T(F): 98.3 (29 Jan 2018 05:46), Max: 99.2 (28 Jan 2018 18:26)  HR: 87 (29 Jan 2018 05:46) (80 - 117)  BP: 101/69 (29 Jan 2018 05:46) (98/57 - 120/62)  BP(mean): --  RR: 17 (29 Jan 2018 05:46) (17 - 18)  SpO2: 99% (29 Jan 2018 05:46) (99% - 100%)    I&O's Summary        PHYSICAL EXAM:    GA: NAD, A+0 x 3  CV: RRR  Pulm: CTA BL  Abd:  BS, soft, nontender, nondistended, no rebound or guarding,   Incision: clean, covered with bandage  Uterus: Fundus midline; nontender  Extremities: ntbl      LABS:                          9.2    9.53  )-----------( 433      ( 28 Jan 2018 04:30 )             28.6   baso 0.3    eos 3.1    imm gran 2.2    lymph 19.2   mono 7.0    poly 68.2                         9.5    14.81 )-----------( 429      ( 26 Jan 2018 15:46 )             29.0   baso 0.1    eos 0.7    imm gran 1.1    lymph 12.4   mono 5.8    poly 79.9                         9.7    13.53 )-----------( 403      ( 26 Jan 2018 08:02 )             29.9   baso 0.2    eos 0.9    imm gran 1.3    lymph 15.6   mono 6.1    poly 75.9                   RADIOLOGY & ADDITIONAL TESTS:

## 2018-01-29 NOTE — PROGRESS NOTE ADULT - PROBLEM SELECTOR PLAN 1
-cont vanc, zosyn  -f/u Bcx (1/25, 26): p=neg, Ucx = neg  -appreciate ID reqs, wound care reqs  -wet to dry daily dressings  -daily cbc to evaluate WBC  -routine pp care  TNSS R3
neuro: po analgesia, morphine prn dressing change  cv: tachycardia decreased, hemodynamically stable. No signs/sx of septic shock. qsofa score =0  pulm: saturating well on RA, increase ambulation + IS use  GI: reg diet  : strict I/O  Heme: am cbc w/ diff pending, dvt ppx: HSQ, sCDs  ID; cont zosyn, f/u wound, blood, urine cx (1/24)  Sung Macho R3
neuro: po analgesia, percocet prn dressing change  cv: tachycardia decreased, hemodynamically stable.   pulm: saturating well on RA, increase ambulation + IS use  GI: reg diet  : strict I/O  Heme: am cbc w/ diff pending, dvt ppx: HSQ, sCDs  ID; cont zosyn, and Vancomycin. f/u wound, blood, urine cx (1/24), f/u ID consult this [facility] PGY3
-leukocytosis resolved, no pain, afebrile X48h and blood cx are negative  -cont po abx X5 days as per ID  -home wound care in place  -safe for discharge as per ID  -routine PP care  TNSS R3
-wound looks better  -Augmentin 875 mg po bid + doxycycline 100 mg po BID x 5 more days  -potential side effects of abx explained including GI, Cdiff, etc.  -potential side effects of doxy explained including GI and photosensitivity  -advised pump and dump breast milk while on abx  -DC planning  -can f/u PRN with me in ID office in 10 days [phone]  -home wound care to be arranged per primary team

## 2018-01-29 NOTE — PROGRESS NOTE ADULT - SUBJECTIVE AND OBJECTIVE BOX
DAY CLEARY 27y [id number]    Patient is a 27y old  Female who presents with a chief complaint of postpartum fever (26 Jan 2018 10:54)      Follow Up/CC:  fever    Interval History/ROS: feels well    Allergies    No Known Allergies    Intolerances        ANTIMICROBIALS:  amoxicillin  875 milliGRAM(s)/clavulanate 1 every 12 hours  doxycycline hyclate Capsule 100 every 12 hours      MEDICATIONS  (STANDING):  amoxicillin  875 milliGRAM(s)/clavulanate 1 Tablet(s) Oral every 12 hours  benzocaine 15 mG/menthol 3.6 mG Lozenge 1 Lozenge Oral daily  doxycycline hyclate Capsule 100 milliGRAM(s) Oral every 12 hours  heparin  Injectable 5000 Unit(s) SubCutaneous every 12 hours  morphine  - Injectable 4 milliGRAM(s) IV Push once  multivitamin 1 Tablet(s) Oral daily  simethicone 80 milliGRAM(s) Chew every 6 hours    MEDICATIONS  (PRN):  acetaminophen   Tablet. 975 milliGRAM(s) Oral every 6 hours PRN Mild Pain (1 - 3)        Vital Signs Last 24 Hrs  T(C): 36.8 (29 Jan 2018 05:46), Max: 37.3 (28 Jan 2018 18:26)  T(F): 98.3 (29 Jan 2018 05:46), Max: 99.2 (28 Jan 2018 18:26)  HR: 87 (29 Jan 2018 05:46) (80 - 92)  BP: 101/69 (29 Jan 2018 05:46) (98/57 - 116/74)  BP(mean): --  RR: 17 (29 Jan 2018 05:46) (17 - 18)  SpO2: 99% (29 Jan 2018 05:46) (99% - 100%)    CBC Full  -  ( 29 Jan 2018 06:00 )  WBC Count : 8.11 K/uL  Hemoglobin : 9.8 g/dL  Hematocrit : 30.4 %  Platelet Count - Automated : 474 K/uL  Mean Cell Volume : 89.1 fL  Mean Cell Hemoglobin : 28.7 pg  Mean Cell Hemoglobin Concentration : 32.2 %  Auto Neutrophil # : 4.71 K/uL  Auto Lymphocyte # : 2.00 K/uL  Auto Monocyte # : 0.69 K/uL  Auto Eosinophil # : 0.34 K/uL  Auto Basophil # : 0.05 K/uL  Auto Neutrophil % : 58.1 %  Auto Lymphocyte % : 24.7 %  Auto Monocyte % : 8.5 %  Auto Eosinophil % : 4.2 %  Auto Basophil % : 0.6 %                MICROBIOLOGY:  BLOOD  01-26-18 --  --  --      BLOOD  01-24-18 --  --  --      URINE MIDSTREAM  01-24-18 --  --  --              v            RADIOLOGY

## 2018-01-29 NOTE — PROGRESS NOTE ADULT - ATTENDING COMMENTS
-pt feels better  -wound looks better  -can change abx from IV to PO  -Augmentin 875 mg po bid + doxycycline 100 mg po BID x 5 more days  -potential side effects of abx explained including GI, Cdiff, etc.  -potential side effects of doxy explained including GI and photosensitivity  -advised pump and dump breast milk while on abx  -DC planning  -can f/u PRN with me in ID office in 10 days [phone]  -home wound care to be arranged per primary team     Carlos Goodson  Attending Physician   Division of Infectious Disease  Pager #[phone]  After 5pm/weekend or no response, call #[phone]
Carlos Goodson  Attending Physician   Division of Infectious Disease  Pager #[phone]  After 5pm/weekend or no response, call #[phone]

## 2018-01-29 NOTE — PROGRESS NOTE ADULT - ASSESSMENT
26yo  POD#11 s/p primary  for NRFHT presenting with fever and incision suite pain/redness, found to have fever, leukocytosis and Ct evidence of L adnexal collection likely an abscess. Being managed for cellulitis of the  site along with intrapelvic abscess. Patient dong well on current IV abx. Has been afebrile > 40 hours and leukocytosis has resolved.     Recommend  --Fu blood and urine cultures --NGTD  --Continue with Vancomycin and Zosyn 3.378 q8 for now  --Continue IV Vancomycin to 1250mg q12 IV--monitor trough  --Would hold off on further imaging at present--but reasonable to obtain repeat CT after completion of Abx course  --Will discuss with attending regarding discharge abx ( likely PO Augmentin to complete 2 weeks course)
27  a/w postoperative cellulitis on POD10 after emergent pltcs, clinically improving
27  a/w postoperative cellulitis on POD11 after emergent pltcs, HD#2
27 P1 s/p pltcs  a/w wound cellulitis, HD 5, clinically improving
27  a/w wound cellulitis, HD6, clinically improving.
28yo  POD#11 s/p primary  for NRFHT presenting with fever and incision suite pain/redness, found to have fever, leukocytosis and Ct evidence of L adnexal collection likely an abscess. Being managed for cellulitis of the  site along with intrapelvic abscess. Patient dong well on current IV abx. Has been afebrile > 40 hours and leukocytosis has resolved.

## 2018-01-31 LAB
BACTERIA BLD CULT: SIGNIFICANT CHANGE UP
BACTERIA BLD CULT: SIGNIFICANT CHANGE UP

## 2018-02-05 ENCOUNTER — OUTPATIENT (OUTPATIENT)
Dept: OUTPATIENT SERVICES | Facility: HOSPITAL | Age: 28
LOS: 1 days | End: 2018-02-05

## 2018-02-05 ENCOUNTER — APPOINTMENT (OUTPATIENT)
Dept: OBGYN | Facility: HOSPITAL | Age: 28
End: 2018-02-05
Payer: MEDICAID

## 2018-02-05 VITALS
HEIGHT: 65 IN | WEIGHT: 187 LBS | DIASTOLIC BLOOD PRESSURE: 67 MMHG | SYSTOLIC BLOOD PRESSURE: 107 MMHG | HEART RATE: 97 BPM | BODY MASS INDEX: 31.16 KG/M2

## 2018-02-05 PROCEDURE — 99213 OFFICE O/P EST LOW 20 MIN: CPT | Mod: GE

## 2018-02-07 DIAGNOSIS — T81.4XXA INFECTION FOLLOWING A PROCEDURE, INITIAL ENCOUNTER: ICD-10-CM

## 2018-02-12 ENCOUNTER — APPOINTMENT (OUTPATIENT)
Dept: OBGYN | Facility: HOSPITAL | Age: 28
End: 2018-02-12

## 2018-09-20 PROBLEM — O03.9 COMPLETE OR UNSPECIFIED SPONTANEOUS ABORTION WITHOUT COMPLICATION: Chronic | Status: ACTIVE | Noted: 2017-04-04

## 2018-10-04 ENCOUNTER — LABORATORY RESULT (OUTPATIENT)
Age: 28
End: 2018-10-04

## 2018-10-04 ENCOUNTER — OUTPATIENT (OUTPATIENT)
Dept: OUTPATIENT SERVICES | Facility: HOSPITAL | Age: 28
LOS: 1 days | End: 2018-10-04
Payer: MEDICAID

## 2018-10-04 ENCOUNTER — NON-APPOINTMENT (OUTPATIENT)
Age: 28
End: 2018-10-04

## 2018-10-04 ENCOUNTER — APPOINTMENT (OUTPATIENT)
Dept: OBGYN | Facility: CLINIC | Age: 28
End: 2018-10-04
Payer: MEDICAID

## 2018-10-04 VITALS — DIASTOLIC BLOOD PRESSURE: 70 MMHG | BODY MASS INDEX: 29.12 KG/M2 | SYSTOLIC BLOOD PRESSURE: 110 MMHG | WEIGHT: 175 LBS

## 2018-10-04 DIAGNOSIS — N76.0 ACUTE VAGINITIS: ICD-10-CM

## 2018-10-04 PROCEDURE — 81220 CFTR GENE COM VARIANTS: CPT

## 2018-10-04 PROCEDURE — 82105 ALPHA-FETOPROTEIN SERUM: CPT

## 2018-10-04 PROCEDURE — 83655 ASSAY OF LEAD: CPT

## 2018-10-04 PROCEDURE — 99203 OFFICE O/P NEW LOW 30 MIN: CPT | Mod: NC

## 2018-10-04 PROCEDURE — 83020 HEMOGLOBIN ELECTROPHORESIS: CPT | Mod: 26

## 2018-10-04 PROCEDURE — 86480 TB TEST CELL IMMUN MEASURE: CPT

## 2018-10-04 PROCEDURE — 86762 RUBELLA ANTIBODY: CPT

## 2018-10-04 PROCEDURE — 86336 INHIBIN A: CPT

## 2018-10-04 PROCEDURE — 84704 HCG FREE BETACHAIN TEST: CPT

## 2018-10-04 PROCEDURE — 87086 URINE CULTURE/COLONY COUNT: CPT

## 2018-10-04 PROCEDURE — 87340 HEPATITIS B SURFACE AG IA: CPT

## 2018-10-04 PROCEDURE — 86850 RBC ANTIBODY SCREEN: CPT

## 2018-10-04 PROCEDURE — 81243 FMR1 GEN ALY DETC ABNL ALLEL: CPT

## 2018-10-04 PROCEDURE — 36415 COLL VENOUS BLD VENIPUNCTURE: CPT | Mod: NC

## 2018-10-04 PROCEDURE — 82677 ASSAY OF ESTRIOL: CPT

## 2018-10-04 PROCEDURE — 81003 URINALYSIS AUTO W/O SCOPE: CPT | Mod: NC,QW

## 2018-10-04 PROCEDURE — 81003 URINALYSIS AUTO W/O SCOPE: CPT

## 2018-10-04 PROCEDURE — G0463: CPT

## 2018-10-04 PROCEDURE — 81329 SMN1 GENE DOS/DELETION ALYS: CPT

## 2018-10-04 PROCEDURE — 86592 SYPHILIS TEST NON-TREP QUAL: CPT

## 2018-10-04 PROCEDURE — G0452: CPT | Mod: 26

## 2018-10-04 PROCEDURE — 36415 COLL VENOUS BLD VENIPUNCTURE: CPT

## 2018-10-04 PROCEDURE — 86780 TREPONEMA PALLIDUM: CPT

## 2018-10-04 PROCEDURE — 85027 COMPLETE CBC AUTOMATED: CPT

## 2018-10-04 PROCEDURE — 86900 BLOOD TYPING SEROLOGIC ABO: CPT

## 2018-10-04 PROCEDURE — 87389 HIV-1 AG W/HIV-1&-2 AB AG IA: CPT

## 2018-10-04 PROCEDURE — 84443 ASSAY THYROID STIM HORMONE: CPT

## 2018-10-04 PROCEDURE — 83020 HEMOGLOBIN ELECTROPHORESIS: CPT

## 2018-10-05 LAB
CULTURE RESULTS: NO GROWTH — SIGNIFICANT CHANGE UP
HBV SURFACE AG SER-ACNC: SIGNIFICANT CHANGE UP
HCT VFR BLD CALC: 37.7 % — SIGNIFICANT CHANGE UP (ref 34.5–45)
HGB BLD-MCNC: 12.5 G/DL — SIGNIFICANT CHANGE UP (ref 11.5–15.5)
HIV 1+2 AB+HIV1 P24 AG SERPL QL IA: SIGNIFICANT CHANGE UP
MCHC RBC-ENTMCNC: 28 PG — SIGNIFICANT CHANGE UP (ref 27–34)
MCHC RBC-ENTMCNC: 33.2 GM/DL — SIGNIFICANT CHANGE UP (ref 32–36)
MCV RBC AUTO: 84.5 FL — SIGNIFICANT CHANGE UP (ref 80–100)
PLATELET # BLD AUTO: 292 K/UL — SIGNIFICANT CHANGE UP (ref 150–400)
RBC # BLD: 4.46 M/UL — SIGNIFICANT CHANGE UP (ref 3.8–5.2)
RBC # FLD: 17.1 % — HIGH (ref 10.3–14.5)
RPR SERPL-ACNC: SIGNIFICANT CHANGE UP
RUBV IGG SER-ACNC: 0.4 INDEX — SIGNIFICANT CHANGE UP
RUBV IGG SER-IMP: NEGATIVE — SIGNIFICANT CHANGE UP
SPECIMEN SOURCE: SIGNIFICANT CHANGE UP
T PALLIDUM AB TITR SER: POSITIVE
T PALLIDUM IGG SER QL IF: SIGNIFICANT CHANGE UP
TSH SERPL-MCNC: 1.81 UIU/ML — SIGNIFICANT CHANGE UP (ref 0.27–4.2)
WBC # BLD: 7.13 K/UL — SIGNIFICANT CHANGE UP (ref 3.8–10.5)
WBC # FLD AUTO: 7.13 K/UL — SIGNIFICANT CHANGE UP (ref 3.8–10.5)

## 2018-10-06 LAB
GAMMA INTERFERON BACKGROUND BLD IA-ACNC: 0.06 IU/ML — SIGNIFICANT CHANGE UP
HEMOGLOBIN INTERPRETATION: SIGNIFICANT CHANGE UP
HGB A MFR BLD: 96.9 % — SIGNIFICANT CHANGE UP (ref 95.8–98)
HGB A2 MFR BLD: 3.1 % — SIGNIFICANT CHANGE UP (ref 2–3.2)
M TB IFN-G BLD-IMP: NEGATIVE — SIGNIFICANT CHANGE UP
M TB IFN-G CD4+ BCKGRND COR BLD-ACNC: 0.02 IU/ML — SIGNIFICANT CHANGE UP
M TB IFN-G CD4+CD8+ BCKGRND COR BLD-ACNC: 0.06 IU/ML — SIGNIFICANT CHANGE UP
QUANT TB PLUS MITOGEN MINUS NIL: 8.96 IU/ML — SIGNIFICANT CHANGE UP

## 2018-10-07 LAB — LEAD BLD-MCNC: 1 UG/DL — SIGNIFICANT CHANGE UP (ref 0–4)

## 2018-10-08 LAB
1ST TRIMESTER DATA: SIGNIFICANT CHANGE UP
2ND TRIMESTER DATA: SIGNIFICANT CHANGE UP
AFP AMN-MCNC: SIGNIFICANT CHANGE UP
AFP SERPL-ACNC: SIGNIFICANT CHANGE UP
B-HCG FREE SERPL-MCNC: SIGNIFICANT CHANGE UP
B-HCG FREE SERPL-MCNC: SIGNIFICANT CHANGE UP
CLINICAL BIOCHEMIST REVIEW: SIGNIFICANT CHANGE UP
DEMOGRAPHIC DATA: SIGNIFICANT CHANGE UP
INHIBIN A SERPL-MCNC: SIGNIFICANT CHANGE UP
NASAL BONE: PRESENT — SIGNIFICANT CHANGE UP
NT: SIGNIFICANT CHANGE UP
PAPP-A SERPL-ACNC: SIGNIFICANT CHANGE UP
SCREEN-FOOTER: SIGNIFICANT CHANGE UP
U ESTRIOL SERPL-SCNC: SIGNIFICANT CHANGE UP

## 2018-10-10 LAB
CYSTIC FIBROSIS EXPANDED PANEL: SIGNIFICANT CHANGE UP
FRAGILE X PROTEIN (FMRP) PNL BLD: SIGNIFICANT CHANGE UP
SPINAL MUSCULAR ATROPHY: NEGATIVE — SIGNIFICANT CHANGE UP

## 2018-10-11 ENCOUNTER — APPOINTMENT (OUTPATIENT)
Dept: ANTEPARTUM | Facility: CLINIC | Age: 28
End: 2018-10-11
Payer: MEDICAID

## 2018-10-11 ENCOUNTER — ASOB RESULT (OUTPATIENT)
Age: 28
End: 2018-10-11

## 2018-10-11 PROCEDURE — 76811 OB US DETAILED SNGL FETUS: CPT

## 2018-10-11 PROCEDURE — 76817 TRANSVAGINAL US OBSTETRIC: CPT

## 2018-10-17 DIAGNOSIS — Z34.92 ENCOUNTER FOR SUPERVISION OF NORMAL PREGNANCY, UNSPECIFIED, SECOND TRIMESTER: ICD-10-CM

## 2018-10-17 PROBLEM — Z98.891 S/P C-SECTION: Status: RESOLVED | Noted: 2018-10-04 | Resolved: 2018-10-17

## 2018-10-17 PROBLEM — Z23 NEED FOR IMMUNIZATION AGAINST RUBELLA: Status: ACTIVE | Noted: 2018-10-17

## 2018-10-18 ENCOUNTER — OUTPATIENT (OUTPATIENT)
Dept: OUTPATIENT SERVICES | Facility: HOSPITAL | Age: 28
LOS: 1 days | End: 2018-10-18
Payer: MEDICAID

## 2018-10-18 ENCOUNTER — APPOINTMENT (OUTPATIENT)
Dept: OBGYN | Facility: CLINIC | Age: 28
End: 2018-10-18
Payer: MEDICAID

## 2018-10-18 VITALS
SYSTOLIC BLOOD PRESSURE: 114 MMHG | DIASTOLIC BLOOD PRESSURE: 78 MMHG | BODY MASS INDEX: 29.16 KG/M2 | WEIGHT: 175 LBS | HEIGHT: 65 IN

## 2018-10-18 DIAGNOSIS — Z23 ENCOUNTER FOR IMMUNIZATION: ICD-10-CM

## 2018-10-18 DIAGNOSIS — Z34.80 ENCOUNTER FOR SUPERVISION OF OTHER NORMAL PREGNANCY, UNSPECIFIED TRIMESTER: ICD-10-CM

## 2018-10-18 DIAGNOSIS — Z98.891 HISTORY OF UTERINE SCAR FROM PREVIOUS SURGERY: ICD-10-CM

## 2018-10-18 PROCEDURE — 81003 URINALYSIS AUTO W/O SCOPE: CPT | Mod: NC,QW

## 2018-10-18 PROCEDURE — 99213 OFFICE O/P EST LOW 20 MIN: CPT

## 2018-10-18 PROCEDURE — G0463: CPT

## 2018-10-18 PROCEDURE — 81003 URINALYSIS AUTO W/O SCOPE: CPT

## 2018-10-29 DIAGNOSIS — O34.219 MATERNAL CARE FOR UNSPECIFIED TYPE SCAR FROM PREVIOUS CESAREAN DELIVERY: ICD-10-CM

## 2018-10-29 DIAGNOSIS — Z23 ENCOUNTER FOR IMMUNIZATION: ICD-10-CM

## 2018-10-29 DIAGNOSIS — Z34.92 ENCOUNTER FOR SUPERVISION OF NORMAL PREGNANCY, UNSPECIFIED, SECOND TRIMESTER: ICD-10-CM

## 2018-11-26 ENCOUNTER — LABORATORY RESULT (OUTPATIENT)
Age: 28
End: 2018-11-26

## 2018-11-27 ENCOUNTER — APPOINTMENT (OUTPATIENT)
Dept: OBGYN | Facility: CLINIC | Age: 28
End: 2018-11-27
Payer: MEDICAID

## 2018-11-27 ENCOUNTER — NON-APPOINTMENT (OUTPATIENT)
Age: 28
End: 2018-11-27

## 2018-11-27 ENCOUNTER — OUTPATIENT (OUTPATIENT)
Dept: OUTPATIENT SERVICES | Facility: HOSPITAL | Age: 28
LOS: 1 days | End: 2018-11-27
Payer: MEDICAID

## 2018-11-27 ENCOUNTER — APPOINTMENT (OUTPATIENT)
Dept: OBGYN | Facility: CLINIC | Age: 28
End: 2018-11-27

## 2018-11-27 VITALS
DIASTOLIC BLOOD PRESSURE: 57 MMHG | SYSTOLIC BLOOD PRESSURE: 99 MMHG | WEIGHT: 177.13 LBS | BODY MASS INDEX: 29.51 KG/M2 | HEIGHT: 65 IN

## 2018-11-27 DIAGNOSIS — Z34.80 ENCOUNTER FOR SUPERVISION OF OTHER NORMAL PREGNANCY, UNSPECIFIED TRIMESTER: ICD-10-CM

## 2018-11-27 PROCEDURE — 81003 URINALYSIS AUTO W/O SCOPE: CPT

## 2018-11-27 PROCEDURE — G0463: CPT

## 2018-11-27 PROCEDURE — 86592 SYPHILIS TEST NON-TREP QUAL: CPT

## 2018-11-27 PROCEDURE — 99213 OFFICE O/P EST LOW 20 MIN: CPT | Mod: NC,25

## 2018-11-27 PROCEDURE — 86780 TREPONEMA PALLIDUM: CPT

## 2018-11-27 PROCEDURE — 81003 URINALYSIS AUTO W/O SCOPE: CPT | Mod: NC,QW

## 2018-11-27 PROCEDURE — 82950 GLUCOSE TEST: CPT

## 2018-11-28 DIAGNOSIS — Z34.92 ENCOUNTER FOR SUPERVISION OF NORMAL PREGNANCY, UNSPECIFIED, SECOND TRIMESTER: ICD-10-CM

## 2018-11-28 DIAGNOSIS — O34.219 MATERNAL CARE FOR UNSPECIFIED TYPE SCAR FROM PREVIOUS CESAREAN DELIVERY: ICD-10-CM

## 2018-11-28 LAB — GLUCOSE 1H P MEAL SERPL-MCNC: 70 MG/DL — SIGNIFICANT CHANGE UP (ref 70–134)

## 2018-11-30 LAB — T PALLIDUM IGG SER QL IF: SIGNIFICANT CHANGE UP

## 2018-12-01 LAB
RPR SERPL-ACNC: SIGNIFICANT CHANGE UP
T PALLIDUM AB TITR SER: POSITIVE

## 2018-12-26 ENCOUNTER — LABORATORY RESULT (OUTPATIENT)
Age: 28
End: 2018-12-26

## 2018-12-27 ENCOUNTER — OUTPATIENT (OUTPATIENT)
Dept: OUTPATIENT SERVICES | Facility: HOSPITAL | Age: 28
LOS: 1 days | End: 2018-12-27
Payer: MEDICAID

## 2018-12-27 ENCOUNTER — APPOINTMENT (OUTPATIENT)
Dept: OBGYN | Facility: CLINIC | Age: 28
End: 2018-12-27
Payer: MEDICAID

## 2018-12-27 ENCOUNTER — NON-APPOINTMENT (OUTPATIENT)
Age: 28
End: 2018-12-27

## 2018-12-27 VITALS
SYSTOLIC BLOOD PRESSURE: 102 MMHG | HEIGHT: 65 IN | BODY MASS INDEX: 29.53 KG/M2 | DIASTOLIC BLOOD PRESSURE: 70 MMHG | WEIGHT: 177.25 LBS

## 2018-12-27 DIAGNOSIS — Z34.80 ENCOUNTER FOR SUPERVISION OF OTHER NORMAL PREGNANCY, UNSPECIFIED TRIMESTER: ICD-10-CM

## 2018-12-27 PROCEDURE — 85027 COMPLETE CBC AUTOMATED: CPT

## 2018-12-27 PROCEDURE — 81003 URINALYSIS AUTO W/O SCOPE: CPT

## 2018-12-27 PROCEDURE — G0463: CPT

## 2018-12-27 PROCEDURE — 81003 URINALYSIS AUTO W/O SCOPE: CPT | Mod: NC,QW

## 2018-12-27 PROCEDURE — 87389 HIV-1 AG W/HIV-1&-2 AB AG IA: CPT

## 2018-12-27 PROCEDURE — 99213 OFFICE O/P EST LOW 20 MIN: CPT | Mod: NC

## 2018-12-28 LAB
BASOPHILS # BLD AUTO: 0.01 K/UL — SIGNIFICANT CHANGE UP (ref 0–0.2)
BASOPHILS NFR BLD AUTO: 0.1 % — SIGNIFICANT CHANGE UP (ref 0–2)
EOSINOPHIL # BLD AUTO: 0.04 K/UL — SIGNIFICANT CHANGE UP (ref 0–0.5)
EOSINOPHIL NFR BLD AUTO: 0.6 % — SIGNIFICANT CHANGE UP (ref 0–6)
HCT VFR BLD CALC: 36.1 % — SIGNIFICANT CHANGE UP (ref 34.5–45)
HGB BLD-MCNC: 12 G/DL — SIGNIFICANT CHANGE UP (ref 11.5–15.5)
HIV 1+2 AB+HIV1 P24 AG SERPL QL IA: SIGNIFICANT CHANGE UP
IMM GRANULOCYTES NFR BLD AUTO: 0.3 % — SIGNIFICANT CHANGE UP (ref 0–1.5)
LYMPHOCYTES # BLD AUTO: 2.02 K/UL — SIGNIFICANT CHANGE UP (ref 1–3.3)
LYMPHOCYTES # BLD AUTO: 28.5 % — SIGNIFICANT CHANGE UP (ref 13–44)
MCHC RBC-ENTMCNC: 29.9 PG — SIGNIFICANT CHANGE UP (ref 27–34)
MCHC RBC-ENTMCNC: 33.2 GM/DL — SIGNIFICANT CHANGE UP (ref 32–36)
MCV RBC AUTO: 90 FL — SIGNIFICANT CHANGE UP (ref 80–100)
MONOCYTES # BLD AUTO: 0.46 K/UL — SIGNIFICANT CHANGE UP (ref 0–0.9)
MONOCYTES NFR BLD AUTO: 6.5 % — SIGNIFICANT CHANGE UP (ref 2–14)
NEUTROPHILS # BLD AUTO: 4.53 K/UL — SIGNIFICANT CHANGE UP (ref 1.8–7.4)
NEUTROPHILS NFR BLD AUTO: 64 % — SIGNIFICANT CHANGE UP (ref 43–77)
PLATELET # BLD AUTO: 285 K/UL — SIGNIFICANT CHANGE UP (ref 150–400)
RBC # BLD: 4.01 M/UL — SIGNIFICANT CHANGE UP (ref 3.8–5.2)
RBC # FLD: 13.4 % — SIGNIFICANT CHANGE UP (ref 10.3–14.5)
WBC # BLD: 7.08 K/UL — SIGNIFICANT CHANGE UP (ref 3.8–10.5)
WBC # FLD AUTO: 7.08 K/UL — SIGNIFICANT CHANGE UP (ref 3.8–10.5)

## 2019-01-02 ENCOUNTER — APPOINTMENT (OUTPATIENT)
Dept: ANTEPARTUM | Facility: CLINIC | Age: 29
End: 2019-01-02
Payer: MEDICAID

## 2019-01-02 ENCOUNTER — ASOB RESULT (OUTPATIENT)
Age: 29
End: 2019-01-02

## 2019-01-02 PROCEDURE — 76816 OB US FOLLOW-UP PER FETUS: CPT

## 2019-01-07 DIAGNOSIS — O26.843 UTERINE SIZE-DATE DISCREPANCY, THIRD TRIMESTER: ICD-10-CM

## 2019-01-07 DIAGNOSIS — Z34.92 ENCOUNTER FOR SUPERVISION OF NORMAL PREGNANCY, UNSPECIFIED, SECOND TRIMESTER: ICD-10-CM

## 2019-01-22 ENCOUNTER — APPOINTMENT (OUTPATIENT)
Dept: OBGYN | Facility: CLINIC | Age: 29
End: 2019-01-22
Payer: MEDICAID

## 2019-01-22 ENCOUNTER — NON-APPOINTMENT (OUTPATIENT)
Age: 29
End: 2019-01-22

## 2019-01-22 ENCOUNTER — OUTPATIENT (OUTPATIENT)
Dept: OUTPATIENT SERVICES | Facility: HOSPITAL | Age: 29
LOS: 1 days | End: 2019-01-22
Payer: MEDICAID

## 2019-01-22 VITALS
SYSTOLIC BLOOD PRESSURE: 122 MMHG | BODY MASS INDEX: 29.32 KG/M2 | WEIGHT: 176 LBS | DIASTOLIC BLOOD PRESSURE: 80 MMHG | HEIGHT: 65 IN

## 2019-01-22 DIAGNOSIS — Z34.80 ENCOUNTER FOR SUPERVISION OF OTHER NORMAL PREGNANCY, UNSPECIFIED TRIMESTER: ICD-10-CM

## 2019-01-22 PROCEDURE — 81003 URINALYSIS AUTO W/O SCOPE: CPT | Mod: NC,QW

## 2019-01-22 PROCEDURE — 99213 OFFICE O/P EST LOW 20 MIN: CPT | Mod: NC

## 2019-01-22 PROCEDURE — 81003 URINALYSIS AUTO W/O SCOPE: CPT

## 2019-01-22 PROCEDURE — G0463: CPT

## 2019-01-24 DIAGNOSIS — Z34.93 ENCOUNTER FOR SUPERVISION OF NORMAL PREGNANCY, UNSPECIFIED, THIRD TRIMESTER: ICD-10-CM

## 2019-01-29 ENCOUNTER — ASOB RESULT (OUTPATIENT)
Age: 29
End: 2019-01-29

## 2019-01-29 ENCOUNTER — APPOINTMENT (OUTPATIENT)
Dept: ANTEPARTUM | Facility: CLINIC | Age: 29
End: 2019-01-29
Payer: MEDICAID

## 2019-01-29 PROCEDURE — 76816 OB US FOLLOW-UP PER FETUS: CPT

## 2019-02-04 ENCOUNTER — LABORATORY RESULT (OUTPATIENT)
Age: 29
End: 2019-02-04

## 2019-02-04 ENCOUNTER — APPOINTMENT (OUTPATIENT)
Dept: OBGYN | Facility: CLINIC | Age: 29
End: 2019-02-04
Payer: MEDICAID

## 2019-02-04 ENCOUNTER — NON-APPOINTMENT (OUTPATIENT)
Age: 29
End: 2019-02-04

## 2019-02-04 ENCOUNTER — OUTPATIENT (OUTPATIENT)
Dept: OUTPATIENT SERVICES | Facility: HOSPITAL | Age: 29
LOS: 1 days | End: 2019-02-04
Payer: MEDICAID

## 2019-02-04 VITALS
BODY MASS INDEX: 29.55 KG/M2 | WEIGHT: 177.38 LBS | DIASTOLIC BLOOD PRESSURE: 81 MMHG | HEIGHT: 65 IN | SYSTOLIC BLOOD PRESSURE: 114 MMHG

## 2019-02-04 DIAGNOSIS — Z34.80 ENCOUNTER FOR SUPERVISION OF OTHER NORMAL PREGNANCY, UNSPECIFIED TRIMESTER: ICD-10-CM

## 2019-02-04 PROCEDURE — 99213 OFFICE O/P EST LOW 20 MIN: CPT | Mod: NC

## 2019-02-04 PROCEDURE — 81003 URINALYSIS AUTO W/O SCOPE: CPT

## 2019-02-04 PROCEDURE — G0463: CPT

## 2019-02-04 PROCEDURE — 81003 URINALYSIS AUTO W/O SCOPE: CPT | Mod: NC,QW

## 2019-02-04 PROCEDURE — 87653 STREP B DNA AMP PROBE: CPT

## 2019-02-06 LAB
GROUP B BETA STREP DNA (PCR): SIGNIFICANT CHANGE UP
GROUP B BETA STREP INTERPRETATION: SIGNIFICANT CHANGE UP
SOURCE GROUP B STREP: SIGNIFICANT CHANGE UP

## 2019-02-07 DIAGNOSIS — Z34.92 ENCOUNTER FOR SUPERVISION OF NORMAL PREGNANCY, UNSPECIFIED, SECOND TRIMESTER: ICD-10-CM

## 2019-02-07 DIAGNOSIS — Z34.93 ENCOUNTER FOR SUPERVISION OF NORMAL PREGNANCY, UNSPECIFIED, THIRD TRIMESTER: ICD-10-CM

## 2019-02-12 ENCOUNTER — OUTPATIENT (OUTPATIENT)
Dept: OUTPATIENT SERVICES | Facility: HOSPITAL | Age: 29
LOS: 1 days | End: 2019-02-12
Payer: MEDICAID

## 2019-02-12 DIAGNOSIS — Z01.818 ENCOUNTER FOR OTHER PREPROCEDURAL EXAMINATION: ICD-10-CM

## 2019-02-12 DIAGNOSIS — Z98.890 OTHER SPECIFIED POSTPROCEDURAL STATES: ICD-10-CM

## 2019-02-12 LAB
BLD GP AB SCN SERPL QL: NEGATIVE — SIGNIFICANT CHANGE UP
HCT VFR BLD CALC: 35.2 % — SIGNIFICANT CHANGE UP (ref 34.5–45)
HGB BLD-MCNC: 11.4 G/DL — LOW (ref 11.5–15.5)
MCHC RBC-ENTMCNC: 28.6 PG — SIGNIFICANT CHANGE UP (ref 27–34)
MCHC RBC-ENTMCNC: 32.4 GM/DL — SIGNIFICANT CHANGE UP (ref 32–36)
MCV RBC AUTO: 88.4 FL — SIGNIFICANT CHANGE UP (ref 80–100)
PLATELET # BLD AUTO: 279 K/UL — SIGNIFICANT CHANGE UP (ref 150–400)
RBC # BLD: 3.98 M/UL — SIGNIFICANT CHANGE UP (ref 3.8–5.2)
RBC # FLD: 13.5 % — SIGNIFICANT CHANGE UP (ref 10.3–14.5)
RH IG SCN BLD-IMP: POSITIVE — SIGNIFICANT CHANGE UP
WBC # BLD: 7.46 K/UL — SIGNIFICANT CHANGE UP (ref 3.8–10.5)
WBC # FLD AUTO: 7.46 K/UL — SIGNIFICANT CHANGE UP (ref 3.8–10.5)

## 2019-02-12 PROCEDURE — 85027 COMPLETE CBC AUTOMATED: CPT

## 2019-02-12 PROCEDURE — G0463: CPT

## 2019-02-12 PROCEDURE — 86850 RBC ANTIBODY SCREEN: CPT

## 2019-02-12 PROCEDURE — 86901 BLOOD TYPING SEROLOGIC RH(D): CPT

## 2019-02-12 PROCEDURE — 86900 BLOOD TYPING SEROLOGIC ABO: CPT

## 2019-02-12 RX ORDER — CEFAZOLIN SODIUM 1 G
2000 VIAL (EA) INJECTION ONCE
Qty: 0 | Refills: 0 | Status: DISCONTINUED | OUTPATIENT
Start: 2019-02-25 | End: 2019-02-28

## 2019-02-12 RX ORDER — LIDOCAINE HCL 20 MG/ML
0.2 VIAL (ML) INJECTION ONCE
Qty: 0 | Refills: 0 | Status: DISCONTINUED | OUTPATIENT
Start: 2019-02-27 | End: 2019-02-28

## 2019-02-12 RX ORDER — SODIUM CHLORIDE 9 MG/ML
1000 INJECTION, SOLUTION INTRAVENOUS
Qty: 0 | Refills: 0 | Status: DISCONTINUED | OUTPATIENT
Start: 2019-02-25 | End: 2019-02-28

## 2019-02-12 RX ORDER — METOCLOPRAMIDE HCL 10 MG
10 TABLET ORAL ONCE
Qty: 0 | Refills: 0 | Status: DISCONTINUED | OUTPATIENT
Start: 2019-02-25 | End: 2019-02-28

## 2019-02-13 NOTE — ED PROVIDER NOTE - NS ED MD DISPO DISCHARGE
PA NOTE:  POD#1 s/p rpt cs and btl   in the room     Patient seen at bedside resting comfortably offers no new complaints. + Ambulation, due to void + flatus; tolerating regular diet. both breastfeeding and bottle feeding. Denies HA, CP, SOB, N/V/D,  no bm; dizziness, palpitations, worsening abdominal pain, worsening vaginal bleeding, or any other concerns.     Vital Signs Last 24 Hrs  T(C): 36.8 (2019 04:00), Max: 37 (2019 13:00)  T(F): 98.3 (2019 04:00), Max: 98.6 (2019 13:00)  HR: 84 (2019 04:00) (84 - 106)  BP: 116/75 (2019 04:00) (96/59 - 132/60)  BP(mean): --  RR: 18 (2019 04:00) (16 - 18)  SpO2: 99% (2019 04:00) (99% - 100%)    Gen: A&O x 3, NAD  Chest: CTABL  Cardiac: S1+S2+ RRR  Breast: Soft, nontender, nonengorged  Abdomen: +BS; soft; Nontender, nondistended, dressing removed Incision C/D/I steri strips in place   Gyn: Minimal lochia  Extremities: Nontender, DTRS 2+, no worsening edema      MEDICATIONS  (STANDING):  docusate sodium 100 milliGRAM(s) Oral two times a day  ferrous    sulfate 325 milliGRAM(s) Oral daily  folic acid 1 milliGRAM(s) Oral daily  heparin  Injectable 5000 Unit(s) SubCutaneous every 12 hours  ibuprofen  Tablet. 600 milliGRAM(s) Oral every 6 hours  magnesium hydroxide Suspension 30 milliLiter(s) Oral daily  prenatal multivitamin 1 Tablet(s) Oral daily  senna 2 Tablet(s) Oral at bedtime  simethicone 80 milliGRAM(s) Chew every 4 hours    PA NOTE:  POD#1 s/p rpt cs and btl   in the room   -Pain management as needed  -cont post op care  -adv diet to regular   -OOB and ambulate  -f/u Rpt CBC   -encourage insentive spirometer use  -Encourage breastfeeding   -d/w dr beatty in house ob on call covering [facility] today Home

## 2019-02-19 ENCOUNTER — APPOINTMENT (OUTPATIENT)
Dept: OBGYN | Facility: CLINIC | Age: 29
End: 2019-02-19
Payer: MEDICAID

## 2019-02-19 ENCOUNTER — NON-APPOINTMENT (OUTPATIENT)
Age: 29
End: 2019-02-19

## 2019-02-19 ENCOUNTER — TRANSCRIPTION ENCOUNTER (OUTPATIENT)
Age: 29
End: 2019-02-19

## 2019-02-19 ENCOUNTER — OUTPATIENT (OUTPATIENT)
Dept: OUTPATIENT SERVICES | Facility: HOSPITAL | Age: 29
LOS: 1 days | End: 2019-02-19
Payer: MEDICAID

## 2019-02-19 VITALS
HEIGHT: 65 IN | WEIGHT: 177.13 LBS | BODY MASS INDEX: 29.51 KG/M2 | DIASTOLIC BLOOD PRESSURE: 67 MMHG | SYSTOLIC BLOOD PRESSURE: 100 MMHG

## 2019-02-19 DIAGNOSIS — Z34.80 ENCOUNTER FOR SUPERVISION OF OTHER NORMAL PREGNANCY, UNSPECIFIED TRIMESTER: ICD-10-CM

## 2019-02-19 PROCEDURE — G0463: CPT

## 2019-02-19 PROCEDURE — 81003 URINALYSIS AUTO W/O SCOPE: CPT | Mod: NC,QW

## 2019-02-19 PROCEDURE — 81003 URINALYSIS AUTO W/O SCOPE: CPT

## 2019-02-19 PROCEDURE — 99213 OFFICE O/P EST LOW 20 MIN: CPT | Mod: NC

## 2019-02-21 DIAGNOSIS — Z34.93 ENCOUNTER FOR SUPERVISION OF NORMAL PREGNANCY, UNSPECIFIED, THIRD TRIMESTER: ICD-10-CM

## 2019-02-24 ENCOUNTER — TRANSCRIPTION ENCOUNTER (OUTPATIENT)
Age: 29
End: 2019-02-24

## 2019-02-25 ENCOUNTER — INPATIENT (INPATIENT)
Facility: HOSPITAL | Age: 29
LOS: 2 days | Discharge: HOME | End: 2019-02-28
Attending: OBSTETRICS & GYNECOLOGY | Admitting: OBSTETRICS & GYNECOLOGY
Payer: MEDICAID

## 2019-02-25 ENCOUNTER — APPOINTMENT (OUTPATIENT)
Dept: OBGYN | Facility: CLINIC | Age: 29
End: 2019-02-25

## 2019-02-25 VITALS — HEIGHT: 65 IN | WEIGHT: 176.37 LBS

## 2019-02-25 DIAGNOSIS — Z01.818 ENCOUNTER FOR OTHER PREPROCEDURAL EXAMINATION: ICD-10-CM

## 2019-02-25 DIAGNOSIS — Z98.890 OTHER SPECIFIED POSTPROCEDURAL STATES: ICD-10-CM

## 2019-02-25 LAB
BLD GP AB SCN SERPL QL: NEGATIVE — SIGNIFICANT CHANGE UP
RH IG SCN BLD-IMP: POSITIVE — SIGNIFICANT CHANGE UP

## 2019-02-25 PROCEDURE — 59514 CESAREAN DELIVERY ONLY: CPT | Mod: U9

## 2019-02-25 RX ORDER — SODIUM CHLORIDE 9 MG/ML
1000 INJECTION, SOLUTION INTRAVENOUS
Qty: 0 | Refills: 0 | Status: DISCONTINUED | OUTPATIENT
Start: 2019-02-27 | End: 2019-02-28

## 2019-02-25 RX ORDER — OXYCODONE HYDROCHLORIDE 5 MG/1
5 TABLET ORAL
Qty: 0 | Refills: 0 | Status: COMPLETED | OUTPATIENT
Start: 2019-02-27 | End: 2019-03-06

## 2019-02-25 RX ORDER — OXYTOCIN 10 UNIT/ML
41.67 VIAL (ML) INJECTION
Qty: 20 | Refills: 0 | Status: DISCONTINUED | OUTPATIENT
Start: 2019-02-25 | End: 2019-02-28

## 2019-02-25 RX ORDER — ONDANSETRON 8 MG/1
4 TABLET, FILM COATED ORAL EVERY 6 HOURS
Qty: 0 | Refills: 0 | Status: DISCONTINUED | OUTPATIENT
Start: 2019-02-25 | End: 2019-02-27

## 2019-02-25 RX ORDER — SODIUM CHLORIDE 9 MG/ML
1000 INJECTION, SOLUTION INTRAVENOUS ONCE
Qty: 0 | Refills: 0 | Status: COMPLETED | OUTPATIENT
Start: 2019-02-25 | End: 2019-02-25

## 2019-02-25 RX ORDER — OXYTOCIN 10 UNIT/ML
333.33 VIAL (ML) INJECTION
Qty: 20 | Refills: 0 | Status: DISCONTINUED | OUTPATIENT
Start: 2019-02-27 | End: 2019-02-28

## 2019-02-25 RX ORDER — HEPARIN SODIUM 5000 [USP'U]/ML
5000 INJECTION INTRAVENOUS; SUBCUTANEOUS EVERY 12 HOURS
Qty: 0 | Refills: 0 | Status: DISCONTINUED | OUTPATIENT
Start: 2019-02-25 | End: 2019-02-28

## 2019-02-25 RX ORDER — NALOXONE HYDROCHLORIDE 4 MG/.1ML
0.1 SPRAY NASAL
Qty: 0 | Refills: 0 | Status: DISCONTINUED | OUTPATIENT
Start: 2019-02-25 | End: 2019-02-27

## 2019-02-25 RX ORDER — DEXAMETHASONE 0.5 MG/5ML
4 ELIXIR ORAL EVERY 6 HOURS
Qty: 0 | Refills: 0 | Status: DISCONTINUED | OUTPATIENT
Start: 2019-02-25 | End: 2019-02-27

## 2019-02-25 RX ORDER — FERROUS SULFATE 325(65) MG
325 TABLET ORAL DAILY
Qty: 0 | Refills: 0 | Status: DISCONTINUED | OUTPATIENT
Start: 2019-02-25 | End: 2019-02-26

## 2019-02-25 RX ORDER — GLYCERIN ADULT
1 SUPPOSITORY, RECTAL RECTAL AT BEDTIME
Qty: 0 | Refills: 0 | Status: DISCONTINUED | OUTPATIENT
Start: 2019-02-25 | End: 2019-02-28

## 2019-02-25 RX ORDER — KETOROLAC TROMETHAMINE 30 MG/ML
30 SYRINGE (ML) INJECTION EVERY 6 HOURS
Qty: 0 | Refills: 0 | Status: DISCONTINUED | OUTPATIENT
Start: 2019-02-25 | End: 2019-02-26

## 2019-02-25 RX ORDER — ACETAMINOPHEN 500 MG
975 TABLET ORAL EVERY 6 HOURS
Qty: 0 | Refills: 0 | Status: DISCONTINUED | OUTPATIENT
Start: 2019-02-25 | End: 2019-02-28

## 2019-02-25 RX ORDER — TETANUS TOXOID, REDUCED DIPHTHERIA TOXOID AND ACELLULAR PERTUSSIS VACCINE, ADSORBED 5; 2.5; 8; 8; 2.5 [IU]/.5ML; [IU]/.5ML; UG/.5ML; UG/.5ML; UG/.5ML
0.5 SUSPENSION INTRAMUSCULAR ONCE
Qty: 0 | Refills: 0 | Status: DISCONTINUED | OUTPATIENT
Start: 2019-02-25 | End: 2019-02-28

## 2019-02-25 RX ORDER — CITRIC ACID/SODIUM CITRATE 300-500 MG
15 SOLUTION, ORAL ORAL ONCE
Qty: 0 | Refills: 0 | Status: COMPLETED | OUTPATIENT
Start: 2019-02-25 | End: 2019-02-25

## 2019-02-25 RX ORDER — SIMETHICONE 80 MG/1
80 TABLET, CHEWABLE ORAL EVERY 4 HOURS
Qty: 0 | Refills: 0 | Status: DISCONTINUED | OUTPATIENT
Start: 2019-02-25 | End: 2019-02-28

## 2019-02-25 RX ORDER — SODIUM CHLORIDE 9 MG/ML
1000 INJECTION, SOLUTION INTRAVENOUS
Qty: 0 | Refills: 0 | Status: DISCONTINUED | OUTPATIENT
Start: 2019-02-25 | End: 2019-02-28

## 2019-02-25 RX ORDER — LANOLIN
1 OINTMENT (GRAM) TOPICAL
Qty: 0 | Refills: 0 | Status: DISCONTINUED | OUTPATIENT
Start: 2019-02-25 | End: 2019-02-28

## 2019-02-25 RX ORDER — OXYTOCIN 10 UNIT/ML
41.67 VIAL (ML) INJECTION
Qty: 20 | Refills: 0 | Status: DISCONTINUED | OUTPATIENT
Start: 2019-02-27 | End: 2019-02-28

## 2019-02-25 RX ORDER — INFLUENZA VIRUS VACCINE 15; 15; 15; 15 UG/.5ML; UG/.5ML; UG/.5ML; UG/.5ML
0.5 SUSPENSION INTRAMUSCULAR ONCE
Qty: 0 | Refills: 0 | Status: DISCONTINUED | OUTPATIENT
Start: 2019-02-25 | End: 2019-02-28

## 2019-02-25 RX ORDER — FAMOTIDINE 10 MG/ML
20 INJECTION INTRAVENOUS ONCE
Qty: 0 | Refills: 0 | Status: COMPLETED | OUTPATIENT
Start: 2019-02-25 | End: 2019-02-25

## 2019-02-25 RX ORDER — DOCUSATE SODIUM 100 MG
100 CAPSULE ORAL
Qty: 0 | Refills: 0 | Status: DISCONTINUED | OUTPATIENT
Start: 2019-02-25 | End: 2019-02-26

## 2019-02-25 RX ORDER — IBUPROFEN 200 MG
600 TABLET ORAL EVERY 6 HOURS
Qty: 0 | Refills: 0 | Status: COMPLETED | OUTPATIENT
Start: 2019-02-25 | End: 2020-01-24

## 2019-02-25 RX ORDER — OXYCODONE HYDROCHLORIDE 5 MG/1
5 TABLET ORAL EVERY 4 HOURS
Qty: 0 | Refills: 0 | Status: COMPLETED | OUTPATIENT
Start: 2019-02-27 | End: 2019-03-06

## 2019-02-25 RX ORDER — DIPHENHYDRAMINE HCL 50 MG
25 CAPSULE ORAL EVERY 6 HOURS
Qty: 0 | Refills: 0 | Status: DISCONTINUED | OUTPATIENT
Start: 2019-02-25 | End: 2019-02-28

## 2019-02-25 RX ADMIN — HEPARIN SODIUM 5000 UNIT(S): 5000 INJECTION INTRAVENOUS; SUBCUTANEOUS at 21:30

## 2019-02-25 RX ADMIN — Medication 30 MILLIGRAM(S): at 18:05

## 2019-02-25 RX ADMIN — Medication 15 MILLILITER(S): at 13:21

## 2019-02-25 RX ADMIN — Medication 125 MILLIUNIT(S)/MIN: at 16:27

## 2019-02-25 RX ADMIN — SODIUM CHLORIDE 2000 MILLILITER(S): 9 INJECTION, SOLUTION INTRAVENOUS at 12:15

## 2019-02-25 RX ADMIN — FAMOTIDINE 20 MILLIGRAM(S): 10 INJECTION INTRAVENOUS at 13:21

## 2019-02-25 NOTE — PATIENT PROFILE OB - ALERT: PERTINENT HISTORY
20 Week Level II Sonogram/Follow up Sonogram for Growth/Fetal Non-Stress Test (NST)/Non Invasive Prenatal Screen (NIPS)/Ultra Screen at 12 Weeks/1st Trimester Sonogram/BioPhysical Profile(s)

## 2019-02-25 NOTE — PRE-ANESTHESIA EVALUATION ADULT - NSANTHOSAYNRD_GEN_A_CORE
No. MINDY screening performed.  STOP BANG Legend: 0-2 = LOW Risk; 3-4 = INTERMEDIATE Risk; 5-8 = HIGH Risk

## 2019-02-26 ENCOUNTER — TRANSCRIPTION ENCOUNTER (OUTPATIENT)
Age: 29
End: 2019-02-26

## 2019-02-26 LAB
HCT VFR BLD CALC: 28.7 % — LOW (ref 34.5–45)
HGB BLD-MCNC: 10.2 G/DL — LOW (ref 11.5–15.5)
MCHC RBC-ENTMCNC: 30.5 PG — SIGNIFICANT CHANGE UP (ref 27–34)
MCHC RBC-ENTMCNC: 35.6 GM/DL — SIGNIFICANT CHANGE UP (ref 32–36)
MCV RBC AUTO: 85.7 FL — SIGNIFICANT CHANGE UP (ref 80–100)
PLATELET # BLD AUTO: 196 K/UL — SIGNIFICANT CHANGE UP (ref 150–400)
RBC # BLD: 3.35 M/UL — LOW (ref 3.8–5.2)
RBC # FLD: 12.6 % — SIGNIFICANT CHANGE UP (ref 10.3–14.5)
WBC # BLD: 10.1 K/UL — SIGNIFICANT CHANGE UP (ref 3.8–10.5)
WBC # FLD AUTO: 10.1 K/UL — SIGNIFICANT CHANGE UP (ref 3.8–10.5)

## 2019-02-26 RX ORDER — KETOROLAC TROMETHAMINE 30 MG/ML
30 SYRINGE (ML) INJECTION ONCE
Qty: 0 | Refills: 0 | Status: DISCONTINUED | OUTPATIENT
Start: 2019-02-26 | End: 2019-02-26

## 2019-02-26 RX ORDER — ASCORBIC ACID 60 MG
500 TABLET,CHEWABLE ORAL DAILY
Qty: 0 | Refills: 0 | Status: DISCONTINUED | OUTPATIENT
Start: 2019-02-26 | End: 2019-02-28

## 2019-02-26 RX ORDER — DOCUSATE SODIUM 100 MG
100 CAPSULE ORAL
Qty: 0 | Refills: 0 | Status: DISCONTINUED | OUTPATIENT
Start: 2019-02-26 | End: 2019-02-28

## 2019-02-26 RX ORDER — FERROUS SULFATE 325(65) MG
325 TABLET ORAL
Qty: 0 | Refills: 0 | Status: DISCONTINUED | OUTPATIENT
Start: 2019-02-26 | End: 2019-02-28

## 2019-02-26 RX ADMIN — Medication 30 MILLIGRAM(S): at 14:21

## 2019-02-26 RX ADMIN — Medication 30 MILLIGRAM(S): at 05:45

## 2019-02-26 RX ADMIN — Medication 30 MILLIGRAM(S): at 22:15

## 2019-02-26 RX ADMIN — HEPARIN SODIUM 5000 UNIT(S): 5000 INJECTION INTRAVENOUS; SUBCUTANEOUS at 11:54

## 2019-02-26 RX ADMIN — Medication 30 MILLIGRAM(S): at 21:37

## 2019-02-26 RX ADMIN — HEPARIN SODIUM 5000 UNIT(S): 5000 INJECTION INTRAVENOUS; SUBCUTANEOUS at 21:37

## 2019-02-26 RX ADMIN — Medication 30 MILLIGRAM(S): at 14:50

## 2019-02-26 RX ADMIN — Medication 500 MILLIGRAM(S): at 14:34

## 2019-02-26 RX ADMIN — Medication 30 MILLIGRAM(S): at 00:45

## 2019-02-26 RX ADMIN — Medication 1 TABLET(S): at 11:53

## 2019-02-26 RX ADMIN — Medication 100 MILLIGRAM(S): at 18:05

## 2019-02-26 RX ADMIN — Medication 30 MILLIGRAM(S): at 06:15

## 2019-02-26 RX ADMIN — Medication 30 MILLIGRAM(S): at 00:05

## 2019-02-26 RX ADMIN — SIMETHICONE 80 MILLIGRAM(S): 80 TABLET, CHEWABLE ORAL at 04:35

## 2019-02-26 RX ADMIN — Medication 325 MILLIGRAM(S): at 18:03

## 2019-02-26 NOTE — DISCHARGE NOTE OB - MATERIALS PROVIDED
[facility] Hearing Screen Program/Vaccinations/Breastfeeding Guide and Packet/Birth Certificate Instructions/[facility]  Screening Program/Shaken Baby Prevention Handout/  Immunization Record/Breastfeeding Log/Back To Sleep Handout/Breastfeeding Mother’s Support Group Information/Guide to Postpartum Care

## 2019-02-26 NOTE — PROGRESS NOTE ADULT - SUBJECTIVE AND OBJECTIVE BOX
Day 1 of Anesthesia Pain Management Service    SUBJECTIVE: I'm okay  Pain Scale Score:    [X] Refer to charted pain scores    THERAPY: Epidural Bupivacaine 0.01 % and Fentanyl 3 micrograms/mL     Demand Dose: 3 mL  Lockout: 15 minutes   Continuous Rate:  10 mL    MEDICATIONS  (STANDING):  acetaminophen   Tablet .. 975 milliGRAM(s) Oral every 6 hours  ceFAZolin   IVPB 2000 milliGRAM(s) IV Intermittent once  diphtheria/tetanus/pertussis (acellular) Vaccine (ADAcel) 0.5 milliLiter(s) IntraMuscular once  fentaNYL (3 MICROgram(s)/mL) + BUpivacaine 0.01% in 0.9% Sodium Chloride PCEA 250 milliLiter(s) Epidural PCA Continuous  ferrous    sulfate 325 milliGRAM(s) Oral daily  heparin  Injectable 5000 Unit(s) SubCutaneous every 12 hours  ibuprofen  Tablet. 600 milliGRAM(s) Oral every 6 hours  influenza   Vaccine 0.5 milliLiter(s) IntraMuscular once  ketorolac   Injectable 30 milliGRAM(s) IV Push every 6 hours  lactated ringers. 1000 milliLiter(s) (125 mL/Hr) IV Continuous <Continuous>  lactated ringers. 1000 milliLiter(s) (125 mL/Hr) IV Continuous <Continuous>  oxytocin Infusion 41.667 milliUNIT(s)/Min (125 mL/Hr) IV Continuous <Continuous>  prenatal multivitamin 1 Tablet(s) Oral daily    MEDICATIONS  (PRN):  dexamethasone  Injectable 4 milliGRAM(s) IV Push every 6 hours PRN Nausea, IF ondansetron is ineffective after 30 - 60 minutes  diphenhydrAMINE 25 milliGRAM(s) Oral every 6 hours PRN Itching  docusate sodium 100 milliGRAM(s) Oral two times a day PRN Stool Softening  fentaNYL (3 MICROgram(s)/mL) + BUpivacaine 0.01% in 0.9% Sodium Chloride PCEA Rescue Clinician Bolus 5 milliLiter(s) Epidural every 15 minutes PRN Moderate Pain (4 - 6)  glycerin Suppository - Adult 1 Suppository(s) Rectal at bedtime PRN Constipation  lanolin Ointment 1 Application(s) Topical every 3 hours PRN Sore Nipples  metoclopramide Injectable 10 milliGRAM(s) IV Push once PRN Nausea and/or Vomiting  naloxone Injectable 0.1 milliGRAM(s) IV Push every 3 minutes PRN For ANY of the following changes in patient status:  A. RR LESS THAN 10 breaths per minute, B. Oxygen saturation LESS THAN 90%, C. Sedation score of 6  ondansetron Injectable 4 milliGRAM(s) IV Push every 6 hours PRN Nausea  simethicone 80 milliGRAM(s) Chew every 4 hours PRN Gas      OBJECTIVE:    Assessment of Epidural Catheter Site: 	    [X] Dressing intact	[X] Site non-tender	[X] Site without erythema, discharge, edema  [X] Epidural tubing and connection checked	[X] Gross neurological exam within normal limits  [ ] Catheter removed – tip intact		                          10.2   10.1  )-----------( 196      ( 26 Feb 2019 05:59 )             28.7     Vital Signs Last 24 Hrs  T(C): 36.4 (02-26-19 @ 08:50), Max: 36.8 (02-26-19 @ 01:00)  T(F): 97.5 (02-26-19 @ 08:50), Max: 98.2 (02-26-19 @ 01:00)  HR: 81 (02-26-19 @ 08:50) (62 - 81)  BP: 101/63 (02-26-19 @ 08:50) (96/52 - 116/59)  BP(mean): 82 (02-25-19 @ 18:50) (69 - 84)  RR: 18 (02-26-19 @ 08:50) (16 - 22)  SpO2: 98% (02-26-19 @ 08:50) (97% - 100%)      Sedation Score:	[X] Alert	[ ] Drowsy	[ ] Arousable  [ ] Asleep  [ ] Unresponsive    Side Effects:	[X] None	[ ] Nausea	[ ] Vomiting  [ ] Pruritus  		[ ] Weakness  [ ] Numbness  [ ] Other:    ASSESSMENT/ PLAN:    Therapy:                         [X] Continue   [ ] Discontinue   [ ] Change to PRN Analgesics    Documentation and Verification of current medications:  [X] Done	[ ] Not done, not eligible, reason:    Comments:

## 2019-02-26 NOTE — DISCHARGE NOTE OB - HOSPITAL COURSE
Patient had uncomplicated repeat low transverse  section.  Please see operative note for details.  During postpartum course patient's vitals were stable, vaginal bleeding appropriate, and pain well controlled.  Post operation day one hematocrit was appropriate.  On day of discharge patient was ambulating, her pain controlled with oral medications, had adequate oral intake, and was voiding freely.  Discharge instructions and precautions were given.  Will return to clinic in 2 weeks for incision check.  Postpartum birth control plan is condoms.

## 2019-02-26 NOTE — DISCHARGE NOTE OB - PROVIDER TOKENS
FREE:[LAST:[[facility]],PHONE:[phone]],FAX:[(   [phone]    -],ADDRESS:[[address] [place] [address] floor]]

## 2019-02-26 NOTE — PROGRESS NOTE ADULT - SUBJECTIVE AND OBJECTIVE BOX
Day 1 of Anesthesia Pain Management Service    SUBJECTIVE: I'm okay  Pain Scale Score:    [X] Refer to charted pain scores    THERAPY: Epidural Bupivacaine 0.01 % and Fentanyl 3 micrograms/mL     Demand Dose: 3 mL  Lockout: 15 minutes   Continuous Rate:  10 mL    MEDICATIONS  (STANDING):  acetaminophen   Tablet .. 975 milliGRAM(s) Oral every 6 hours  ceFAZolin   IVPB 2000 milliGRAM(s) IV Intermittent once  diphtheria/tetanus/pertussis (acellular) Vaccine (ADAcel) 0.5 milliLiter(s) IntraMuscular once  fentaNYL (3 MICROgram(s)/mL) + BUpivacaine 0.01% in 0.9% Sodium Chloride PCEA 250 milliLiter(s) Epidural PCA Continuous  ferrous    sulfate 325 milliGRAM(s) Oral daily  heparin  Injectable 5000 Unit(s) SubCutaneous every 12 hours  ibuprofen  Tablet. 600 milliGRAM(s) Oral every 6 hours  influenza   Vaccine 0.5 milliLiter(s) IntraMuscular once  ketorolac   Injectable 30 milliGRAM(s) IV Push every 6 hours  lactated ringers. 1000 milliLiter(s) (125 mL/Hr) IV Continuous <Continuous>  lactated ringers. 1000 milliLiter(s) (125 mL/Hr) IV Continuous <Continuous>  oxytocin Infusion 41.667 milliUNIT(s)/Min (125 mL/Hr) IV Continuous <Continuous>  prenatal multivitamin 1 Tablet(s) Oral daily    MEDICATIONS  (PRN):  dexamethasone  Injectable 4 milliGRAM(s) IV Push every 6 hours PRN Nausea, IF ondansetron is ineffective after 30 - 60 minutes  diphenhydrAMINE 25 milliGRAM(s) Oral every 6 hours PRN Itching  docusate sodium 100 milliGRAM(s) Oral two times a day PRN Stool Softening  fentaNYL (3 MICROgram(s)/mL) + BUpivacaine 0.01% in 0.9% Sodium Chloride PCEA Rescue Clinician Bolus 5 milliLiter(s) Epidural every 15 minutes PRN Moderate Pain (4 - 6)  glycerin Suppository - Adult 1 Suppository(s) Rectal at bedtime PRN Constipation  lanolin Ointment 1 Application(s) Topical every 3 hours PRN Sore Nipples  metoclopramide Injectable 10 milliGRAM(s) IV Push once PRN Nausea and/or Vomiting  naloxone Injectable 0.1 milliGRAM(s) IV Push every 3 minutes PRN For ANY of the following changes in patient status:  A. RR LESS THAN 10 breaths per minute, B. Oxygen saturation LESS THAN 90%, C. Sedation score of 6  ondansetron Injectable 4 milliGRAM(s) IV Push every 6 hours PRN Nausea  simethicone 80 milliGRAM(s) Chew every 4 hours PRN Gas      OBJECTIVE:    Assessment of Epidural Catheter Site: 	    [X] Dressing intact	[X] Site non-tender	[X] Site without erythema, discharge, edema  [X] Epidural tubing and connection checked	[X] Gross neurological exam within normal limits  [ ] Catheter removed – tip intact		                          10.2   10.1  )-----------( 196      ( 26 Feb 2019 05:59 )             28.7     Vital Signs Last 24 Hrs  T(C): 36.7 (02-26-19 @ 05:00), Max: 36.8 (02-26-19 @ 01:00)  T(F): 98 (02-26-19 @ 05:00), Max: 98.2 (02-26-19 @ 01:00)  HR: 70 (02-26-19 @ 05:00) (62 - 80)  BP: 98/62 (02-26-19 @ 05:00) (96/52 - 116/59)  BP(mean): 82 (02-25-19 @ 18:50) (69 - 84)  RR: 18 (02-26-19 @ 05:00) (16 - 22)  SpO2: 97% (02-26-19 @ 05:00) (97% - 100%)      Sedation Score:	[X] Alert	[ ] Drowsy	[ ] Arousable  [ ] Asleep  [ ] Unresponsive    Side Effects:	[X] None	[ ] Nausea	[ ] Vomiting  [ ] Pruritus  		[ ] Weakness  [ ] Numbness  [ ] Other:    ASSESSMENT/ PLAN:    Therapy:                         [X] Continue   [ ] Discontinue   [ ] Change to PRN Analgesics    Documentation and Verification of current medications:  [X] Done	[ ] Not done, not eligible, reason:    Comments:

## 2019-02-26 NOTE — DISCHARGE NOTE OB - MEDICATION SUMMARY - MEDICATIONS TO TAKE
I will START or STAY ON the medications listed below when I get home from the hospital:    acetaminophen 325 mg oral tablet  -- 3 tab(s) by mouth every 6 hours  -- Indication: For  delivery delivered    ibuprofen 600 mg oral tablet  -- 1 tab(s) by mouth every 6 hours  -- Indication: For  delivery delivered I will START or STAY ON the medications listed below when I get home from the hospital:    acetaminophen 325 mg oral tablet  -- 3 tab(s) by mouth every 6 hours  -- Indication: For  delivery delivered    ibuprofen 600 mg oral tablet  -- 1 tab(s) by mouth every 6 hours  -- Indication: For  delivery delivered    oxyCODONE 5 mg oral capsule  -- 1 cap(s) by mouth every 6 hours MDD:4 caps  -- Caution federal law prohibits the transfer of this drug to any person other  than the person for whom it was prescribed.  It is very important that you take or use this exactly as directed.  Do not skip doses or discontinue unless directed by your doctor.  May cause drowsiness.  Alcohol may intensify this effect.  Use care when operating dangerous machinery.  This prescription cannot be refilled.  Using more of this medication than prescribed may cause serious breathing problems.    -- Indication: For  delivery delivered

## 2019-02-26 NOTE — PROGRESS NOTE ADULT - SUBJECTIVE AND OBJECTIVE BOX
OB Progress Note:  Delivery, POD#1    S: 27yo POD#1 s/p rLTCS . Her pain is well controlled. She is tolerating a regular diet and passing flatus. Denies N/V. Denies CP/SOB/lightheadedness/dizziness.   She is ambulating without difficulty.   Voiding spontaneously.     O:   Vital Signs Last 24 Hrs  T(C): 36.7 (2019 05:00), Max: 36.8 (2019 01:00)  T(F): 98 (2019 05:00), Max: 98.2 (2019 01:00)  HR: 70 (2019 05:00) (62 - 80)  BP: 98/62 (2019 05:00) (96/52 - 116/59)  BP(mean): 82 (2019 18:50) (69 - 84)  RR: 18 (2019 05:00) (16 - 22)  SpO2: 97% (2019 05:00) (97% - 100%)    Labs:  Blood type: B Positive  Rubella IgG: Negative (10-04 @ 21:39)  RPR: Positive                          10.2<L>   10.1 >-----------< 196    (  @ 05:59 )             28.7<L>                  PE:  General: NAD  Abdomen: Mildly distended, appropriately tender, incision c/d/i.  Extremities: No erythema, no pitting edema

## 2019-02-26 NOTE — DISCHARGE NOTE OB - PLAN OF CARE
postop recovery After discharge, please stay on pelvic rest for 6 weeks, meaning no sexual intercourse, no tampons and no douching.  No driving for 2 weeks as women can loose a lot of blood during delivery and there is a possibility of being lightheaded/fainting.  No lifting objects heavier than baby for two weeks.  Expect to have vaginal bleeding/spotting for up to six weeks.  The bleeding should get lighter and more white/light brown with time.  For bleeding soaking more than a pad an hour or passing clots greater than the size of your fist, come in to the emergency department.    Follow up in clinic in 2 weeks for incision check.  Call clinic for noticeable increase in redness or swelling at incision, discharge from incision, or opening of skin at incision site.

## 2019-02-26 NOTE — DISCHARGE NOTE OB - CARE PLAN
Principal Discharge DX:	 delivery delivered  Goal:	postop recovery  Assessment and plan of treatment:	After discharge, please stay on pelvic rest for 6 weeks, meaning no sexual intercourse, no tampons and no douching.  No driving for 2 weeks as women can loose a lot of blood during delivery and there is a possibility of being lightheaded/fainting.  No lifting objects heavier than baby for two weeks.  Expect to have vaginal bleeding/spotting for up to six weeks.  The bleeding should get lighter and more white/light brown with time.  For bleeding soaking more than a pad an hour or passing clots greater than the size of your fist, come in to the emergency department.    Follow up in clinic in 2 weeks for incision check.  Call clinic for noticeable increase in redness or swelling at incision, discharge from incision, or opening of skin at incision site.

## 2019-02-26 NOTE — CHART NOTE - NSCHARTNOTEFT_GEN_A_CORE
PA NOTE            POD#1         Vital Signs Last 24 Hrs  T(C): 36.4 (2019 08:50), Max: 36.8 (2019 01:00)  T(F): 97.5 (2019 08:50), Max: 98.2 (2019 01:00)  HR: 81 (2019 08:50) (62 - 81)  BP: 101/63 (2019 08:50) (96/52 - 116/59)  BP(mean): 82 (2019 18:50) (69 - 84)  RR: 18 (2019 08:50) (16 - 22)  SpO2: 98% (2019 08:50) (97% - 100%)               10.2   10.1  )-----------( 196      (  @ 05:59 )             28.7           Assessment: Anemia secondary to acute blood loss due to delivery    Plan:  - Ferrous Sulfate, Colace, Vitamin C supplementation.  - Monitor for signs/symptoms of anemia.   - Does not require transfusion at this time

## 2019-02-27 RX ORDER — OXYCODONE HYDROCHLORIDE 5 MG/1
5 TABLET ORAL
Qty: 0 | Refills: 0 | Status: DISCONTINUED | OUTPATIENT
Start: 2019-02-27 | End: 2019-02-28

## 2019-02-27 RX ORDER — IBUPROFEN 200 MG
600 TABLET ORAL EVERY 6 HOURS
Qty: 0 | Refills: 0 | Status: DISCONTINUED | OUTPATIENT
Start: 2019-02-27 | End: 2019-02-28

## 2019-02-27 RX ORDER — OXYCODONE HYDROCHLORIDE 5 MG/1
5 TABLET ORAL EVERY 4 HOURS
Qty: 0 | Refills: 0 | Status: DISCONTINUED | OUTPATIENT
Start: 2019-02-27 | End: 2019-02-28

## 2019-02-27 RX ADMIN — Medication 1 TABLET(S): at 12:18

## 2019-02-27 RX ADMIN — Medication 600 MILLIGRAM(S): at 05:36

## 2019-02-27 RX ADMIN — Medication 500 MILLIGRAM(S): at 12:19

## 2019-02-27 RX ADMIN — Medication 600 MILLIGRAM(S): at 12:50

## 2019-02-27 RX ADMIN — Medication 975 MILLIGRAM(S): at 12:50

## 2019-02-27 RX ADMIN — Medication 600 MILLIGRAM(S): at 19:00

## 2019-02-27 RX ADMIN — Medication 600 MILLIGRAM(S): at 19:35

## 2019-02-27 RX ADMIN — Medication 100 MILLIGRAM(S): at 19:06

## 2019-02-27 RX ADMIN — HEPARIN SODIUM 5000 UNIT(S): 5000 INJECTION INTRAVENOUS; SUBCUTANEOUS at 09:59

## 2019-02-27 RX ADMIN — Medication 600 MILLIGRAM(S): at 06:36

## 2019-02-27 RX ADMIN — SIMETHICONE 80 MILLIGRAM(S): 80 TABLET, CHEWABLE ORAL at 05:35

## 2019-02-27 RX ADMIN — Medication 975 MILLIGRAM(S): at 12:18

## 2019-02-27 RX ADMIN — Medication 325 MILLIGRAM(S): at 05:36

## 2019-02-27 RX ADMIN — Medication 600 MILLIGRAM(S): at 12:19

## 2019-02-27 RX ADMIN — Medication 975 MILLIGRAM(S): at 06:36

## 2019-02-27 RX ADMIN — Medication 975 MILLIGRAM(S): at 19:00

## 2019-02-27 RX ADMIN — Medication 975 MILLIGRAM(S): at 19:35

## 2019-02-27 RX ADMIN — Medication 975 MILLIGRAM(S): at 05:36

## 2019-02-27 RX ADMIN — HEPARIN SODIUM 5000 UNIT(S): 5000 INJECTION INTRAVENOUS; SUBCUTANEOUS at 20:32

## 2019-02-27 RX ADMIN — Medication 100 MILLIGRAM(S): at 05:35

## 2019-02-27 NOTE — PROGRESS NOTE ADULT - SUBJECTIVE AND OBJECTIVE BOX
see ATTG Section Below S: Patient doing well. Pain controlled, tolerating PO, voiding, passing flatus. Breastfeeding. Ambulating without difficulty.     O: Vital Signs Last 24 Hrs  T(C): 36.7 (27 Feb 2019 13:15), Max: 37.1 (27 Feb 2019 01:00)  T(F): 98.1 (27 Feb 2019 13:15), Max: 98.7 (27 Feb 2019 01:00)  HR: 70 (27 Feb 2019 13:15) (69 - 101)  BP: 103/65 (27 Feb 2019 13:15) (90/60 - 114/77)  BP(mean): --  RR: 18 (27 Feb 2019 13:15) (18 - 18)  SpO2: 98% (27 Feb 2019 13:15) (97% - 98%)    Gen: NAD  Abd: soft, NT, ND, fundus firm below umbilicus  Incision: c/d/i  Lochia: moderate  Ext: no tenderness    Labs:                        10.2   10.1  )-----------( 196      ( 26 Feb 2019 05:59 )             28.7

## 2019-02-27 NOTE — PROGRESS NOTE ADULT - SUBJECTIVE AND OBJECTIVE BOX
Day 2 of Anesthesia Pain Management Service    SUBJECTIVE: I'm okay  Pain Scale Score:    [X] Refer to charted pain scores    THERAPY: Epidural Bupivacaine 0.01 % and Fentanyl 3 micrograms/mL     Demand Dose: 3 mL  Lockout: 15 minutes   Continuous Rate:  10 mL    MEDICATIONS  (STANDING):  acetaminophen   Tablet .. 975 milliGRAM(s) Oral every 6 hours  ascorbic acid 500 milliGRAM(s) Oral daily  ceFAZolin   IVPB 2000 milliGRAM(s) IV Intermittent once  diphtheria/tetanus/pertussis (acellular) Vaccine (ADAcel) 0.5 milliLiter(s) IntraMuscular once  docusate sodium 100 milliGRAM(s) Oral two times a day  ferrous    sulfate 325 milliGRAM(s) Oral two times a day  heparin  Injectable 5000 Unit(s) SubCutaneous every 12 hours  ibuprofen  Tablet. 600 milliGRAM(s) Oral every 6 hours  influenza   Vaccine 0.5 milliLiter(s) IntraMuscular once  lactated ringers. 1000 milliLiter(s) (125 mL/Hr) IV Continuous <Continuous>  lactated ringers. 1000 milliLiter(s) (125 mL/Hr) IV Continuous <Continuous>  lactated ringers. 1000 milliLiter(s) (125 mL/Hr) IV Continuous <Continuous>  lidocaine 1% Injectable 0.2 milliLiter(s) Local Injection once  oxyCODONE    IR 5 milliGRAM(s) Oral every 3 hours  oxytocin Infusion 41.667 milliUNIT(s)/Min (125 mL/Hr) IV Continuous <Continuous>  oxytocin Infusion 333.333 milliUNIT(s)/Min (1000 mL/Hr) IV Continuous <Continuous>  oxytocin Infusion 41.667 milliUNIT(s)/Min (125 mL/Hr) IV Continuous <Continuous>  prenatal multivitamin 1 Tablet(s) Oral daily    MEDICATIONS  (PRN):  diphenhydrAMINE 25 milliGRAM(s) Oral every 6 hours PRN Itching  glycerin Suppository - Adult 1 Suppository(s) Rectal at bedtime PRN Constipation  lanolin Ointment 1 Application(s) Topical every 3 hours PRN Sore Nipples  metoclopramide Injectable 10 milliGRAM(s) IV Push once PRN Nausea and/or Vomiting  oxyCODONE    IR 5 milliGRAM(s) Oral every 4 hours PRN Severe Pain (7 - 10)  simethicone 80 milliGRAM(s) Chew every 4 hours PRN Gas      OBJECTIVE:    Assessment of Epidural Catheter Site: 	    [ ] Dressing intact	[X] Site non-tender	[X] Site without erythema, discharge, edema  [ ] Epidural tubing and connection checked	[X] Gross neurological exam within normal limits  [X] Catheter removed                          10.2   10.1  )-----------( 196      ( 26 Feb 2019 05:59 )             28.7     Vital Signs Last 24 Hrs  T(C): 36.7 (02-27-19 @ 05:00), Max: 37.1 (02-27-19 @ 01:00)  T(F): 98.1 (02-27-19 @ 05:00), Max: 98.7 (02-27-19 @ 01:00)  HR: 69 (02-27-19 @ 05:00) (69 - 101)  BP: 114/77 (02-27-19 @ 05:00) (100/65 - 114/77)  BP(mean): --  RR: 18 (02-27-19 @ 05:00) (18 - 18)  SpO2: 98% (02-27-19 @ 05:00) (97% - 98%)      Sedation Score:	[X] Alert	[ ] Drowsy	[ ] Arousable  [ ] Asleep  [ ] Unresponsive    Side Effects:	[X] None	[ ] Nausea	[ ] Vomiting  [ ] Pruritus  		[ ] Weakness  [ ] Numbness  [ ] Other:    ASSESSMENT/ PLAN:    Therapy:                         [ ] Continue   [X] Discontinue   [X] Change to PRN Analgesics    Documentation and Verification of current medications:  [X] Done	[ ] Not done, not eligible, reason:    Comments:

## 2019-02-28 VITALS
SYSTOLIC BLOOD PRESSURE: 103 MMHG | RESPIRATION RATE: 20 BRPM | DIASTOLIC BLOOD PRESSURE: 71 MMHG | HEART RATE: 71 BPM | TEMPERATURE: 98 F | OXYGEN SATURATION: 98 %

## 2019-02-28 LAB
HCT VFR BLD CALC: 32.7 % — LOW (ref 34.5–45)
HGB BLD-MCNC: 11.3 G/DL — LOW (ref 11.5–15.5)
MCHC RBC-ENTMCNC: 30.1 PG — SIGNIFICANT CHANGE UP (ref 27–34)
MCHC RBC-ENTMCNC: 34.4 GM/DL — SIGNIFICANT CHANGE UP (ref 32–36)
MCV RBC AUTO: 87.4 FL — SIGNIFICANT CHANGE UP (ref 80–100)
PLATELET # BLD AUTO: 225 K/UL — SIGNIFICANT CHANGE UP (ref 150–400)
RBC # BLD: 3.74 M/UL — LOW (ref 3.8–5.2)
RBC # FLD: 12.8 % — SIGNIFICANT CHANGE UP (ref 10.3–14.5)
WBC # BLD: 7 K/UL — SIGNIFICANT CHANGE UP (ref 3.8–10.5)
WBC # FLD AUTO: 7 K/UL — SIGNIFICANT CHANGE UP (ref 3.8–10.5)

## 2019-02-28 PROCEDURE — 59050 FETAL MONITOR W/REPORT: CPT

## 2019-02-28 PROCEDURE — 86850 RBC ANTIBODY SCREEN: CPT

## 2019-02-28 PROCEDURE — 86900 BLOOD TYPING SEROLOGIC ABO: CPT

## 2019-02-28 PROCEDURE — 86780 TREPONEMA PALLIDUM: CPT

## 2019-02-28 PROCEDURE — 59025 FETAL NON-STRESS TEST: CPT

## 2019-02-28 PROCEDURE — 86901 BLOOD TYPING SEROLOGIC RH(D): CPT

## 2019-02-28 PROCEDURE — 86592 SYPHILIS TEST NON-TREP QUAL: CPT

## 2019-02-28 PROCEDURE — 85027 COMPLETE CBC AUTOMATED: CPT

## 2019-02-28 RX ORDER — OXYCODONE HYDROCHLORIDE 5 MG/1
1 TABLET ORAL
Qty: 5 | Refills: 0
Start: 2019-02-28

## 2019-02-28 RX ORDER — IBUPROFEN 200 MG
1 TABLET ORAL
Qty: 0 | Refills: 0 | COMMUNITY
Start: 2019-02-28

## 2019-02-28 RX ORDER — ACETAMINOPHEN 500 MG
3 TABLET ORAL
Qty: 0 | Refills: 0 | COMMUNITY
Start: 2019-02-28

## 2019-02-28 RX ADMIN — Medication 100 MILLIGRAM(S): at 05:28

## 2019-02-28 RX ADMIN — Medication 325 MILLIGRAM(S): at 05:28

## 2019-02-28 RX ADMIN — Medication 600 MILLIGRAM(S): at 03:11

## 2019-02-28 RX ADMIN — Medication 975 MILLIGRAM(S): at 02:39

## 2019-02-28 RX ADMIN — Medication 500 MILLIGRAM(S): at 11:32

## 2019-02-28 RX ADMIN — Medication 600 MILLIGRAM(S): at 02:40

## 2019-02-28 RX ADMIN — HEPARIN SODIUM 5000 UNIT(S): 5000 INJECTION INTRAVENOUS; SUBCUTANEOUS at 09:25

## 2019-02-28 RX ADMIN — Medication 1 TABLET(S): at 11:32

## 2019-02-28 RX ADMIN — Medication 975 MILLIGRAM(S): at 09:26

## 2019-02-28 RX ADMIN — Medication 975 MILLIGRAM(S): at 03:11

## 2019-02-28 RX ADMIN — Medication 600 MILLIGRAM(S): at 09:25

## 2019-02-28 NOTE — PROGRESS NOTE ADULT - PROBLEM SELECTOR PLAN 1
- Continue motrin, tylenol, oxycodone PRN for pain control.  - Increase ambulation  - Continue regular diet  -Condoms for Birth Control  - Discharge planning  Zoe Richards PGY-1
- Continue regular diet.  - Increase ambulation.  - continue PCEA for pain.  - F/u AM CBC    DAYSI Bernardo, PGY-1
Plan:   PO analgesia  OOB  Reg Diet  DVT ppx  Routine postop care

## 2019-02-28 NOTE — PROGRESS NOTE ADULT - SUBJECTIVE AND OBJECTIVE BOX
OB Postpartum Note: Repeat  Delivery, POD#3    S: 27yo POD#3 s/p rLTCS. The patient feels well.  Pain is well controlled. She is tolerating a regular diet and passing flatus. She is voiding spontaneously, and ambulating without difficulty. Denies CP/SOB. Denies lightheadedness/dizziness. Denies N/V.    O:  Vitals:  Vital Signs Last 24 Hrs  T(C): 36.6 (2019 05:00), Max: 36.7 (2019 13:15)  T(F): 97.9 (2019 05:00), Max: 98.1 (2019 13:15)  HR: 79 (2019 05:00) (70 - 81)  BP: 107/72 (2019 05:00) (90/60 - 107/72)  BP(mean): --  RR: 18 (2019 05:00) (18 - 18)  SpO2: 98% (2019 05:00) (97% - 98%)    MEDICATIONS  (STANDING):  acetaminophen   Tablet .. 975 milliGRAM(s) Oral every 6 hours  ascorbic acid 500 milliGRAM(s) Oral daily  ceFAZolin   IVPB 2000 milliGRAM(s) IV Intermittent once  diphtheria/tetanus/pertussis (acellular) Vaccine (ADAcel) 0.5 milliLiter(s) IntraMuscular once  docusate sodium 100 milliGRAM(s) Oral two times a day  ferrous    sulfate 325 milliGRAM(s) Oral two times a day  heparin  Injectable 5000 Unit(s) SubCutaneous every 12 hours  ibuprofen  Tablet. 600 milliGRAM(s) Oral every 6 hours  influenza   Vaccine 0.5 milliLiter(s) IntraMuscular once  lactated ringers. 1000 milliLiter(s) (125 mL/Hr) IV Continuous <Continuous>  lactated ringers. 1000 milliLiter(s) (125 mL/Hr) IV Continuous <Continuous>  lactated ringers. 1000 milliLiter(s) (125 mL/Hr) IV Continuous <Continuous>  lidocaine 1% Injectable 0.2 milliLiter(s) Local Injection once  measles/mumps/rubella Vaccine 0.5 milliLiter(s) SubCutaneous once  oxyCODONE    IR 5 milliGRAM(s) Oral every 3 hours  oxytocin Infusion 41.667 milliUNIT(s)/Min (125 mL/Hr) IV Continuous <Continuous>  oxytocin Infusion 333.333 milliUNIT(s)/Min (1000 mL/Hr) IV Continuous <Continuous>  oxytocin Infusion 41.667 milliUNIT(s)/Min (125 mL/Hr) IV Continuous <Continuous>  prenatal multivitamin 1 Tablet(s) Oral daily    MEDICATIONS  (PRN):  diphenhydrAMINE 25 milliGRAM(s) Oral every 6 hours PRN Itching  glycerin Suppository - Adult 1 Suppository(s) Rectal at bedtime PRN Constipation  lanolin Ointment 1 Application(s) Topical every 3 hours PRN Sore Nipples  metoclopramide Injectable 10 milliGRAM(s) IV Push once PRN Nausea and/or Vomiting  oxyCODONE    IR 5 milliGRAM(s) Oral every 4 hours PRN Severe Pain (7 - 10)  simethicone 80 milliGRAM(s) Chew every 4 hours PRN Gas      LABS:  Blood type: B Positive  Rubella IgG: Negative (10-04 @ 21:39)  RPR: Positive                          11.3<L>   7.0 >-----------< 225    (  @ 06:10 )             32.7<L>                        10.2<L>   10.1 >-----------< 196    (  @ 05:59 )             28.7<L>            Physical exam:  Gen: NAD  Abdomen: Soft, nontender, no distension , firm uterine fundus at umbilicus.  Incision: Clean, dry, and intact   Pelvic: Normal lochia noted  Ext: No calf tenderness

## 2019-02-28 NOTE — PROGRESS NOTE ADULT - ATTENDING COMMENTS
Pt seen and evaluated.  Stable POD#2 s/p repeat C/S  Routine postop Care  D/C planning for tomorrow
Patient doing well  No acute issues  Tolerating Po  Ambulating  Cleared for discharge

## 2019-02-28 NOTE — PROGRESS NOTE ADULT - ASSESSMENT
A/P: 29yo POD#1 s/p rLTCS.  Patient is stable and doing well post-operatively.
A/P: 29yo POD#3 s/p rLTCS.  Patient is stable and is doing well post-operatively.
A: 28y POD#2 s/p rLTCS doing well.

## 2019-03-01 LAB
RPR SERPL-ACNC: SIGNIFICANT CHANGE UP
T PALLIDUM AB TITR SER: POSITIVE
T PALLIDUM IGG SER QL IF: SIGNIFICANT CHANGE UP

## 2019-03-12 ENCOUNTER — APPOINTMENT (OUTPATIENT)
Dept: OBGYN | Facility: CLINIC | Age: 29
End: 2019-03-12

## 2019-03-12 ENCOUNTER — OUTPATIENT (OUTPATIENT)
Dept: OUTPATIENT SERVICES | Facility: HOSPITAL | Age: 29
LOS: 1 days | End: 2019-03-12
Payer: MEDICAID

## 2019-03-12 ENCOUNTER — APPOINTMENT (OUTPATIENT)
Dept: OBGYN | Facility: CLINIC | Age: 29
End: 2019-03-12
Payer: MEDICAID

## 2019-03-12 VITALS — BODY MASS INDEX: 27.79 KG/M2 | WEIGHT: 167 LBS | SYSTOLIC BLOOD PRESSURE: 100 MMHG | DIASTOLIC BLOOD PRESSURE: 60 MMHG

## 2019-03-12 DIAGNOSIS — N76.0 ACUTE VAGINITIS: ICD-10-CM

## 2019-03-12 DIAGNOSIS — Z98.891 HISTORY OF UTERINE SCAR FROM PREVIOUS SURGERY: ICD-10-CM

## 2019-03-12 PROCEDURE — G0463: CPT

## 2019-03-12 PROCEDURE — 99213 OFFICE O/P EST LOW 20 MIN: CPT | Mod: NC

## 2019-03-12 NOTE — HISTORY OF PRESENT ILLNESS
[Postpartum Follow Up] : postpartum follow up [Complications:___] : no complications [Last Pap Date: ___] : Last Pap Date: [unfilled] [Delivery Date: ___] : on [unfilled] [Repeat C/S] : delivered by  section (repeat) [Male] : Delivery History: baby boy [Breastfeeding] : currently nursing [None] : No associated symptoms are reported [Clean/Dry/Intact] : clean, dry and intact [Mild] : mild vaginal bleeding [Not Done] : Examination of breasts not done [Doing Well] : is doing well [No Sign of Infection] : is showing no signs of infection [de-identified] : 28 y.o. , s/p Rpt C/S on 19.  1) PP contraception.  Pt and partner have used condoms x 15 yrs and will continue with this method 2) PP depression screen. Pt denies s/sxs of PP depression. (-)SI, (-)HI. Will see SW at full PP in 1 month 3) Well woman. Pap results not seen. Will do a pap/cultures at next visit 4) At birth, baby evaluated for wheezing. Xray was clear, B/L.  Parents will have baby further assessed via Pediatric Pulmonology [de-identified] : f/up in 4 weeks for full PP exam. MAIN Lieberman, PAC

## 2019-03-18 DIAGNOSIS — Z98.891 HISTORY OF UTERINE SCAR FROM PREVIOUS SURGERY: ICD-10-CM

## 2019-10-30 ENCOUNTER — LABORATORY RESULT (OUTPATIENT)
Age: 29
End: 2019-10-30

## 2019-10-31 ENCOUNTER — LABORATORY RESULT (OUTPATIENT)
Age: 29
End: 2019-10-31

## 2019-10-31 ENCOUNTER — NON-APPOINTMENT (OUTPATIENT)
Age: 29
End: 2019-10-31

## 2019-10-31 ENCOUNTER — RESULT CHARGE (OUTPATIENT)
Age: 29
End: 2019-10-31

## 2019-10-31 ENCOUNTER — APPOINTMENT (OUTPATIENT)
Dept: OBGYN | Facility: CLINIC | Age: 29
End: 2019-10-31
Payer: MEDICAID

## 2019-10-31 ENCOUNTER — OUTPATIENT (OUTPATIENT)
Dept: OUTPATIENT SERVICES | Facility: HOSPITAL | Age: 29
LOS: 1 days | End: 2019-10-31
Payer: MEDICAID

## 2019-10-31 VITALS — DIASTOLIC BLOOD PRESSURE: 70 MMHG | BODY MASS INDEX: 28.29 KG/M2 | WEIGHT: 170 LBS | SYSTOLIC BLOOD PRESSURE: 108 MMHG

## 2019-10-31 DIAGNOSIS — N76.0 ACUTE VAGINITIS: ICD-10-CM

## 2019-10-31 DIAGNOSIS — O26.843 UTERINE SIZE-DATE DISCREPANCY, THIRD TRIMESTER: ICD-10-CM

## 2019-10-31 DIAGNOSIS — Z34.92 ENCOUNTER FOR SUPERVISION OF NORMAL PREGNANCY, UNSPECIFIED, SECOND TRIMESTER: ICD-10-CM

## 2019-10-31 DIAGNOSIS — O34.219 MATERNAL CARE FOR UNSPECIFIED TYPE SCAR FROM PREVIOUS CESAREAN DELIVERY: ICD-10-CM

## 2019-10-31 LAB
ESTIMATED AVERAGE GLUCOSE: 94 MG/DL — SIGNIFICANT CHANGE UP (ref 68–114)
HBA1C BLD-MCNC: 4.9 % — SIGNIFICANT CHANGE UP (ref 4–5.6)
HCT VFR BLD CALC: 38.1 % — SIGNIFICANT CHANGE UP (ref 34.5–45)
HGB BLD-MCNC: 12.3 G/DL — SIGNIFICANT CHANGE UP (ref 11.5–15.5)
HIV 1+2 AB+HIV1 P24 AG SERPL QL IA: SIGNIFICANT CHANGE UP
MCHC RBC-ENTMCNC: 28.3 PG — SIGNIFICANT CHANGE UP (ref 27–34)
MCHC RBC-ENTMCNC: 32.3 GM/DL — SIGNIFICANT CHANGE UP (ref 32–36)
MCV RBC AUTO: 87.6 FL — SIGNIFICANT CHANGE UP (ref 80–100)
PLATELET # BLD AUTO: 278 K/UL — SIGNIFICANT CHANGE UP (ref 150–400)
RBC # BLD: 4.35 M/UL — SIGNIFICANT CHANGE UP (ref 3.8–5.2)
RBC # FLD: 13.6 % — SIGNIFICANT CHANGE UP (ref 10.3–14.5)
TSH SERPL-MCNC: 1.13 UIU/ML — SIGNIFICANT CHANGE UP (ref 0.27–4.2)
WBC # BLD: 6.53 K/UL — SIGNIFICANT CHANGE UP (ref 3.8–10.5)
WBC # FLD AUTO: 6.53 K/UL — SIGNIFICANT CHANGE UP (ref 3.8–10.5)

## 2019-10-31 PROCEDURE — 86480 TB TEST CELL IMMUN MEASURE: CPT

## 2019-10-31 PROCEDURE — 83036 HEMOGLOBIN GLYCOSYLATED A1C: CPT

## 2019-10-31 PROCEDURE — 36415 COLL VENOUS BLD VENIPUNCTURE: CPT | Mod: NC

## 2019-10-31 PROCEDURE — 87389 HIV-1 AG W/HIV-1&-2 AB AG IA: CPT

## 2019-10-31 PROCEDURE — G0463: CPT

## 2019-10-31 PROCEDURE — 83655 ASSAY OF LEAD: CPT

## 2019-10-31 PROCEDURE — 86765 RUBEOLA ANTIBODY: CPT

## 2019-10-31 PROCEDURE — 81003 URINALYSIS AUTO W/O SCOPE: CPT

## 2019-10-31 PROCEDURE — 84443 ASSAY THYROID STIM HORMONE: CPT

## 2019-10-31 PROCEDURE — 87491 CHLMYD TRACH DNA AMP PROBE: CPT

## 2019-10-31 PROCEDURE — 87086 URINE CULTURE/COLONY COUNT: CPT

## 2019-10-31 PROCEDURE — 86850 RBC ANTIBODY SCREEN: CPT

## 2019-10-31 PROCEDURE — 36415 COLL VENOUS BLD VENIPUNCTURE: CPT

## 2019-10-31 PROCEDURE — 87591 N.GONORRHOEAE DNA AMP PROB: CPT

## 2019-10-31 PROCEDURE — 87340 HEPATITIS B SURFACE AG IA: CPT

## 2019-10-31 PROCEDURE — 85027 COMPLETE CBC AUTOMATED: CPT

## 2019-10-31 PROCEDURE — 86900 BLOOD TYPING SEROLOGIC ABO: CPT

## 2019-10-31 PROCEDURE — 99203 OFFICE O/P NEW LOW 30 MIN: CPT | Mod: NC

## 2019-10-31 PROCEDURE — 86762 RUBELLA ANTIBODY: CPT

## 2019-11-01 ENCOUNTER — LABORATORY RESULT (OUTPATIENT)
Age: 29
End: 2019-11-01

## 2019-11-01 LAB
C TRACH RRNA SPEC QL NAA+PROBE: SIGNIFICANT CHANGE UP
HBV SURFACE AG SER-ACNC: SIGNIFICANT CHANGE UP
LEAD BLD-MCNC: 1 UG/DL — SIGNIFICANT CHANGE UP (ref 0–4)
MEV IGG SER-ACNC: 50.5 AU/ML — SIGNIFICANT CHANGE UP
MEV IGG+IGM SER-IMP: POSITIVE — SIGNIFICANT CHANGE UP
N GONORRHOEA RRNA SPEC QL NAA+PROBE: SIGNIFICANT CHANGE UP
RUBV IGG SER-ACNC: 0.5 INDEX — SIGNIFICANT CHANGE UP
RUBV IGG SER-IMP: NEGATIVE — SIGNIFICANT CHANGE UP
SPECIMEN SOURCE: SIGNIFICANT CHANGE UP

## 2019-11-02 LAB
CULTURE RESULTS: SIGNIFICANT CHANGE UP
GAMMA INTERFERON BACKGROUND BLD IA-ACNC: 0.02 IU/ML — SIGNIFICANT CHANGE UP
M TB IFN-G BLD-IMP: NEGATIVE — SIGNIFICANT CHANGE UP
M TB IFN-G CD4+ BCKGRND COR BLD-ACNC: 0.02 IU/ML — SIGNIFICANT CHANGE UP
M TB IFN-G CD4+CD8+ BCKGRND COR BLD-ACNC: 0.03 IU/ML — SIGNIFICANT CHANGE UP
QUANT TB PLUS MITOGEN MINUS NIL: >10 IU/ML — SIGNIFICANT CHANGE UP
SPECIMEN SOURCE: SIGNIFICANT CHANGE UP

## 2019-11-04 ENCOUNTER — RESULT REVIEW (OUTPATIENT)
Age: 29
End: 2019-11-04

## 2019-11-05 ENCOUNTER — TRANSCRIPTION ENCOUNTER (OUTPATIENT)
Age: 29
End: 2019-11-05

## 2019-11-05 LAB
HCG UR QL: POSITIVE
QUALITY CONTROL: YES

## 2019-11-06 DIAGNOSIS — Z34.92 ENCOUNTER FOR SUPERVISION OF NORMAL PREGNANCY, UNSPECIFIED, SECOND TRIMESTER: ICD-10-CM

## 2019-11-06 DIAGNOSIS — O34.219 MATERNAL CARE FOR UNSPECIFIED TYPE SCAR FROM PREVIOUS CESAREAN DELIVERY: ICD-10-CM

## 2019-11-06 LAB — CYTOLOGY SPEC DOC CYTO: SIGNIFICANT CHANGE UP

## 2019-11-15 ENCOUNTER — INPATIENT (INPATIENT)
Facility: HOSPITAL | Age: 29
LOS: 3 days | Discharge: HOME | DRG: 819 | End: 2019-11-19
Attending: OBSTETRICS & GYNECOLOGY | Admitting: OBSTETRICS & GYNECOLOGY
Payer: MEDICAID

## 2019-11-15 VITALS
SYSTOLIC BLOOD PRESSURE: 103 MMHG | TEMPERATURE: 98 F | HEIGHT: 65 IN | RESPIRATION RATE: 18 BRPM | OXYGEN SATURATION: 100 % | HEART RATE: 89 BPM | WEIGHT: 179.9 LBS | DIASTOLIC BLOOD PRESSURE: 66 MMHG

## 2019-11-15 DIAGNOSIS — O00.90 UNSPECIFIED ECTOPIC PREGNANCY WITHOUT INTRAUTERINE PREGNANCY: ICD-10-CM

## 2019-11-15 DIAGNOSIS — O00.81 OTHER ECTOPIC PREGNANCY WITH INTRAUTERINE PREGNANCY: ICD-10-CM

## 2019-11-15 LAB
ANION GAP SERPL CALC-SCNC: 14 MMOL/L — SIGNIFICANT CHANGE UP (ref 5–17)
APTT BLD: 26.3 SEC — LOW (ref 27.5–36.3)
BASOPHILS # BLD AUTO: 0.02 K/UL — SIGNIFICANT CHANGE UP (ref 0–0.2)
BASOPHILS NFR BLD AUTO: 0.3 % — SIGNIFICANT CHANGE UP (ref 0–2)
BLD GP AB SCN SERPL QL: NEGATIVE — SIGNIFICANT CHANGE UP
BUN SERPL-MCNC: 8 MG/DL — SIGNIFICANT CHANGE UP (ref 7–23)
CALCIUM SERPL-MCNC: 8.8 MG/DL — SIGNIFICANT CHANGE UP (ref 8.4–10.5)
CHLORIDE SERPL-SCNC: 102 MMOL/L — SIGNIFICANT CHANGE UP (ref 96–108)
CO2 SERPL-SCNC: 23 MMOL/L — SIGNIFICANT CHANGE UP (ref 22–31)
CREAT SERPL-MCNC: 0.45 MG/DL — LOW (ref 0.5–1.3)
EOSINOPHIL # BLD AUTO: 0.02 K/UL — SIGNIFICANT CHANGE UP (ref 0–0.5)
EOSINOPHIL NFR BLD AUTO: 0.3 % — SIGNIFICANT CHANGE UP (ref 0–6)
GLUCOSE SERPL-MCNC: 116 MG/DL — HIGH (ref 70–99)
HCG SERPL-ACNC: HIGH MIU/ML
HCT VFR BLD CALC: 32.3 % — LOW (ref 34.5–45)
HGB BLD-MCNC: 10.7 G/DL — LOW (ref 11.5–15.5)
IMM GRANULOCYTES NFR BLD AUTO: 0.8 % — SIGNIFICANT CHANGE UP (ref 0–1.5)
INR BLD: 1.06 RATIO — SIGNIFICANT CHANGE UP (ref 0.88–1.16)
LYMPHOCYTES # BLD AUTO: 1.91 K/UL — SIGNIFICANT CHANGE UP (ref 1–3.3)
LYMPHOCYTES # BLD AUTO: 26 % — SIGNIFICANT CHANGE UP (ref 13–44)
MCHC RBC-ENTMCNC: 28.1 PG — SIGNIFICANT CHANGE UP (ref 27–34)
MCHC RBC-ENTMCNC: 33.1 GM/DL — SIGNIFICANT CHANGE UP (ref 32–36)
MCV RBC AUTO: 84.8 FL — SIGNIFICANT CHANGE UP (ref 80–100)
MONOCYTES # BLD AUTO: 0.35 K/UL — SIGNIFICANT CHANGE UP (ref 0–0.9)
MONOCYTES NFR BLD AUTO: 4.8 % — SIGNIFICANT CHANGE UP (ref 2–14)
NEUTROPHILS # BLD AUTO: 4.99 K/UL — SIGNIFICANT CHANGE UP (ref 1.8–7.4)
NEUTROPHILS NFR BLD AUTO: 67.8 % — SIGNIFICANT CHANGE UP (ref 43–77)
NRBC # BLD: 0 /100 WBCS — SIGNIFICANT CHANGE UP (ref 0–0)
PLATELET # BLD AUTO: 227 K/UL — SIGNIFICANT CHANGE UP (ref 150–400)
POTASSIUM SERPL-MCNC: 3.3 MMOL/L — LOW (ref 3.5–5.3)
POTASSIUM SERPL-SCNC: 3.3 MMOL/L — LOW (ref 3.5–5.3)
PROTHROM AB SERPL-ACNC: 12.1 SEC — SIGNIFICANT CHANGE UP (ref 10–12.9)
RBC # BLD: 3.81 M/UL — SIGNIFICANT CHANGE UP (ref 3.8–5.2)
RBC # FLD: 13.7 % — SIGNIFICANT CHANGE UP (ref 10.3–14.5)
RH IG SCN BLD-IMP: POSITIVE — SIGNIFICANT CHANGE UP
SODIUM SERPL-SCNC: 139 MMOL/L — SIGNIFICANT CHANGE UP (ref 135–145)
WBC # BLD: 7.35 K/UL — SIGNIFICANT CHANGE UP (ref 3.8–10.5)
WBC # FLD AUTO: 7.35 K/UL — SIGNIFICANT CHANGE UP (ref 3.8–10.5)

## 2019-11-15 PROCEDURE — 99285 EMERGENCY DEPT VISIT HI MDM: CPT

## 2019-11-15 PROCEDURE — 99222 1ST HOSP IP/OBS MODERATE 55: CPT

## 2019-11-15 PROCEDURE — 76817 TRANSVAGINAL US OBSTETRIC: CPT | Mod: 26

## 2019-11-15 RX ORDER — SODIUM CHLORIDE 9 MG/ML
1000 INJECTION, SOLUTION INTRAVENOUS
Refills: 0 | Status: DISCONTINUED | OUTPATIENT
Start: 2019-11-15 | End: 2019-11-16

## 2019-11-15 RX ADMIN — SODIUM CHLORIDE 125 MILLILITER(S): 9 INJECTION, SOLUTION INTRAVENOUS at 22:09

## 2019-11-15 NOTE — ED PROVIDER NOTE - OBJECTIVE STATEMENT
Patient presents 10 weeks pregnant with vaginal bleeding, no clots + blood. pt has had spotting but is now more. Patient seen at [place] for this and sent home. unknown results of US. (probable IP). no urinary sx. Has felt dizzy standing. Patient with no fever. hx of 1 miscarriage and 2 nl births. Patient does not remember her RH status.

## 2019-11-15 NOTE — ED ADULT NURSE REASSESSMENT NOTE - NS ED NURSE REASSESS COMMENT FT1
Received report from Krista ROWLAND. Patient resting comfortably in stretcher. A&Ox4. Vital signs are stable. Patient in no acute distress. Patient pending inpatient bed assignment. Denies chest pain, SOB, headache, N/V/D, abdominal pain, fever/chills, burning upon urination or difficulty urinating, hematuria. Plan of care discussed. Safety and comfort measures maintained. Will continue to monitor.

## 2019-11-15 NOTE — H&P ADULT - ASSESSMENT
30 y/o  at 9w6d w/ GAVI 20 by early sono presenting with vaginal bleeding. GYN team consulted given concern on TVUS for  scar ectopic pregnancy. VSS. H/h appropriate. No active bleeding on exam. Repeat abdominal ultrasound performed by with Dr. Bhagat of [facility] on patient with full bladder, agree with radiology suspicion for  scar ectopic pregnancy.     Patient counseled on risks of attempting to continue current pregnancy to term including but not limited to pre-term delivery, maternal hemorrhage, maternal and fetal death. Patient counseled on theoretic treatment options of methotrexate and cervical lujan but was made aware that these options are preferable for an earlier gestational age. Patient counseled on recommendation for surgical intervention with D&C vs. wedge resection vs. hysterectomy given size and location of current ectopic pregnancy. Patient counseled on recommendation for uterine artery embolization prior to surgical intervention to decrease risk of hemorrhage. Patient and  voiced understanding of risks and benefits. All questions answered. Patient aware that emergent uterine artery embolization and hysterectomy may be required this admission in the setting of hemorrhage.

## 2019-11-15 NOTE — H&P ADULT - HISTORY OF PRESENT ILLNESS
28 y/o  at 9w6d w/ GAVI 20 by early sono presenting with vaginal bleeding. Patient reports spotting 7 days ago with gush of blood 4 days ago for which she was seen at [facility] at which time she was told she had a subchorionic hematoma. Patient then had no bleeding until yesterday evening at which time she bled 1 gallon followed by 8-10 fully soaked pads up until now. Patient denies fevers, chills, dizziness, palpations, shortness of breath, chest pain, abdominal pain, purulent vaginal discharge, urinary symptoms, changes in bowel movements. This was an unplanned pregnancy, patient was using condoms and had not yet had return of normal menses since last delivery in February.    Name of GYN Physician: [facility]    OBHx: 1/15/18 FT pLTCS NRFHT c/b post-op wound infection, 19 rLTCS, SAB   GYNHx: Denies fibroids, cysts, endometriosis, STI's, Abnormal pap smears   PMH: denies  PSH: denies  Meds: denies  All: NKDA  Soc: no substance use, anxiety/depression    accepts blood

## 2019-11-15 NOTE — ED ADULT NURSE REASSESSMENT NOTE - NS ED NURSE REASSESS COMMENT FT1
Pt returned from US, awaiting to be seen by OB. Pt denies pain at this time. Call bell within reach,  at bedside.

## 2019-11-15 NOTE — H&P ADULT - NSHPLABSRESULTS_GEN_ALL_CORE
10.7   7.35  )-----------( 227      ( 15 Nov 2019 15:49 )             32.3     11-15    139  |  102  |  8   ----------------------------<  116<H>  3.3<L>   |  23  |  0.45<L>    Ca    8.8      15 Nov 2019 15:49      < from: US Transvaginal, OB (11.15.19 @ 16:36) >    EXAM:  US OB TRANSVAGINAL                            PROCEDURE DATE:  11/15/2019            INTERPRETATION:  CLINICAL INFORMATION: 10 weeks pregnant with vaginal   bleeding. Evaluate for intrauterine pregnancy.    LMP: Unknown.    Given estimated due date of 2020, 9 weeks and 6 days.    COMPARISON: CT abdomen and pelvis 2018.    Endovaginal and transabdominal pelvic sonogram.    FINDINGS:    Uterus: Single viable intrauterine pregnancy. The gestational sac is   located in the lower uterine segment. On transvaginal examination, there   is suggestion that the endometrial cavity is located posterior to the   gestational sac. Large amount of hematoma is seen at the uterine fundus   which appears to be distending the endometrial cavity. There is fluid and   blood products within the cervical canal which is also distended. There   is increased vascularity anterior to the gestational sac which may be   related to placental implantation. Overlying myometrial thinning is noted.    Crown Rump Length: 3.5 cm.     Estimated Gestational Age: 10 weeks, 3 days    Yolk Sac: Normal.    Fetal Heart Rate: 184 bpm    Right ovary:  Within normal limits.    Left ovary:  Within normal limits.    Fluid: None.    IMPRESSION:    Single viable intrauterine pregnancy in the lower uterine segment.   Findings are suspicious for ectopic pregnancy in the  section   scar versus gestational sac located in the lower uterine segment with   large subchorionic hematoma. MRI can be performed for further   characterization.    < end of copied text >

## 2019-11-15 NOTE — H&P ADULT - PROBLEM SELECTOR PLAN 1
- admit to GYN service  - Second T&S pending  - 4u PRBCs on hold  - IR aware of patient  - NPO, IVF  - 2 large bore IVs requested from nursing  - Dr. Cleveland, head of GYN aware  - MRI ordered to better evaluate    DAYSI Vee PGY2  d/w Dr. Bhagat

## 2019-11-15 NOTE — H&P ADULT - NSHPPHYSICALEXAM_GEN_ALL_CORE
Gen: NAD  Cards: RRR  Pulm: CTAB  Abd: soft, non-tender, non-distended  : no active bleeding from os, old blood in vault, external os 1cm dilated

## 2019-11-15 NOTE — ED ADULT NURSE NOTE - OBJECTIVE STATEMENT
Pt is 28 y/o female Axox3 presents to ED complaining of vaginal bleeding beginning about 1 week ago worsening til last night. Pt reports being seen in [place] ED on tuesday with no interventions. pt had made OB appt for monday but bleeding was too severe. Pt called OB and was told to be seen at ED for evaluation. Pt is 30 y/o female Axox3 presents to ED complaining of vaginal bleeding beginning about 1 week ago worsening til last night. Pt  reports being seen in [place] ED on Tuesday with no interventions. Pt had made OB appt for Monday but bleeding was too severe reporting 6-8 pads of bright red blood since last night. Pt called OB and was told to be seen at ED for evaluation. Pt endorses dizziness and lightheadedness when standing since heavy bleeding has begun. Pt reports some left sided pelvic pressure a few days ago but none since. Pt denies any abdominal cramping. Pt is well appearing, speaking full sentences without difficulty, breathing spontaneous and unlabored. Pt is 28 y/o female Axox3 presents to ED complaining of vaginal bleeding beginning about 1 week ago worsening til last night. Pt  reports being seen in [place] ED on Tuesday with no interventions. Pt had made OB appt for Monday but bleeding was too severe reporting 6-8 pads of bright red blood since last night. Pt called OB and was told to be seen at ED for evaluation. Pt endorses dizziness and lightheadedness when standing since heavy bleeding has begun. Pt reports some left sided pelvic pressure a few days ago but none since. Pt denies any abdominal cramping. Pt is well appearing, speaking full sentences without difficulty, breathing spontaneous and unlabored. Pt denies CP, SOB, HA, vision changes, n/v/d, fevers chills, abdominal pain. Safety and comfort measures initiated- bed placed in lowest position and side rails raised. Pt oriented to call bell system.

## 2019-11-16 ENCOUNTER — TRANSCRIPTION ENCOUNTER (OUTPATIENT)
Age: 29
End: 2019-11-16

## 2019-11-16 LAB
ANION GAP SERPL CALC-SCNC: 10 MMOL/L — SIGNIFICANT CHANGE UP (ref 5–17)
APTT BLD: 27.7 SEC — SIGNIFICANT CHANGE UP (ref 27.5–36.3)
BLD GP AB SCN SERPL QL: NEGATIVE — SIGNIFICANT CHANGE UP
BUN SERPL-MCNC: 6 MG/DL — LOW (ref 7–23)
CALCIUM SERPL-MCNC: 8.9 MG/DL — SIGNIFICANT CHANGE UP (ref 8.4–10.5)
CHLORIDE SERPL-SCNC: 105 MMOL/L — SIGNIFICANT CHANGE UP (ref 96–108)
CO2 SERPL-SCNC: 24 MMOL/L — SIGNIFICANT CHANGE UP (ref 22–31)
CREAT SERPL-MCNC: 0.43 MG/DL — LOW (ref 0.5–1.3)
GLUCOSE SERPL-MCNC: 90 MG/DL — SIGNIFICANT CHANGE UP (ref 70–99)
HCT VFR BLD CALC: 33.9 % — LOW (ref 34.5–45)
HGB BLD-MCNC: 11.7 G/DL — SIGNIFICANT CHANGE UP (ref 11.5–15.5)
INR BLD: 1.02 RATIO — SIGNIFICANT CHANGE UP (ref 0.88–1.16)
MCHC RBC-ENTMCNC: 29.2 PG — SIGNIFICANT CHANGE UP (ref 27–34)
MCHC RBC-ENTMCNC: 34.5 GM/DL — SIGNIFICANT CHANGE UP (ref 32–36)
MCV RBC AUTO: 84.5 FL — SIGNIFICANT CHANGE UP (ref 80–100)
NRBC # BLD: 0 /100 WBCS — SIGNIFICANT CHANGE UP (ref 0–0)
PLATELET # BLD AUTO: 242 K/UL — SIGNIFICANT CHANGE UP (ref 150–400)
POTASSIUM SERPL-MCNC: 4.1 MMOL/L — SIGNIFICANT CHANGE UP (ref 3.5–5.3)
POTASSIUM SERPL-SCNC: 4.1 MMOL/L — SIGNIFICANT CHANGE UP (ref 3.5–5.3)
PROTHROM AB SERPL-ACNC: 11.7 SEC — SIGNIFICANT CHANGE UP (ref 10–12.9)
RBC # BLD: 4.01 M/UL — SIGNIFICANT CHANGE UP (ref 3.8–5.2)
RBC # FLD: 14.1 % — SIGNIFICANT CHANGE UP (ref 10.3–14.5)
RH IG SCN BLD-IMP: POSITIVE — SIGNIFICANT CHANGE UP
SODIUM SERPL-SCNC: 139 MMOL/L — SIGNIFICANT CHANGE UP (ref 135–145)
WBC # BLD: 7.29 K/UL — SIGNIFICANT CHANGE UP (ref 3.8–10.5)
WBC # FLD AUTO: 7.29 K/UL — SIGNIFICANT CHANGE UP (ref 3.8–10.5)

## 2019-11-16 PROCEDURE — 76937 US GUIDE VASCULAR ACCESS: CPT | Mod: 26

## 2019-11-16 PROCEDURE — 37242 VASC EMBOLIZE/OCCLUDE ARTERY: CPT | Mod: 74

## 2019-11-16 PROCEDURE — 36247 INS CATH ABD/L-EXT ART 3RD: CPT

## 2019-11-16 RX ORDER — NALOXONE HYDROCHLORIDE 4 MG/.1ML
0.1 SPRAY NASAL
Refills: 0 | Status: DISCONTINUED | OUTPATIENT
Start: 2019-11-16 | End: 2019-11-17

## 2019-11-16 RX ORDER — HYDROMORPHONE HYDROCHLORIDE 2 MG/ML
0.5 INJECTION INTRAMUSCULAR; INTRAVENOUS; SUBCUTANEOUS
Refills: 0 | Status: DISCONTINUED | OUTPATIENT
Start: 2019-11-16 | End: 2019-11-16

## 2019-11-16 RX ORDER — HYDROMORPHONE HYDROCHLORIDE 2 MG/ML
0.5 INJECTION INTRAMUSCULAR; INTRAVENOUS; SUBCUTANEOUS
Refills: 0 | Status: DISCONTINUED | OUTPATIENT
Start: 2019-11-16 | End: 2019-11-17

## 2019-11-16 RX ORDER — ONDANSETRON 8 MG/1
4 TABLET, FILM COATED ORAL ONCE
Refills: 0 | Status: DISCONTINUED | OUTPATIENT
Start: 2019-11-16 | End: 2019-11-16

## 2019-11-16 RX ORDER — HYDROMORPHONE HYDROCHLORIDE 2 MG/ML
30 INJECTION INTRAMUSCULAR; INTRAVENOUS; SUBCUTANEOUS
Refills: 0 | Status: DISCONTINUED | OUTPATIENT
Start: 2019-11-16 | End: 2019-11-17

## 2019-11-16 RX ORDER — ONDANSETRON 8 MG/1
4 TABLET, FILM COATED ORAL EVERY 6 HOURS
Refills: 0 | Status: DISCONTINUED | OUTPATIENT
Start: 2019-11-16 | End: 2019-11-17

## 2019-11-16 RX ORDER — SODIUM CHLORIDE 9 MG/ML
1000 INJECTION INTRAMUSCULAR; INTRAVENOUS; SUBCUTANEOUS
Refills: 0 | Status: DISCONTINUED | OUTPATIENT
Start: 2019-11-16 | End: 2019-11-17

## 2019-11-16 RX ADMIN — SODIUM CHLORIDE 125 MILLILITER(S): 9 INJECTION INTRAMUSCULAR; INTRAVENOUS; SUBCUTANEOUS at 17:21

## 2019-11-16 RX ADMIN — HYDROMORPHONE HYDROCHLORIDE 30 MILLILITER(S): 2 INJECTION INTRAMUSCULAR; INTRAVENOUS; SUBCUTANEOUS at 17:13

## 2019-11-16 RX ADMIN — HYDROMORPHONE HYDROCHLORIDE 30 MILLILITER(S): 2 INJECTION INTRAMUSCULAR; INTRAVENOUS; SUBCUTANEOUS at 17:52

## 2019-11-16 RX ADMIN — ONDANSETRON 4 MILLIGRAM(S): 8 TABLET, FILM COATED ORAL at 18:53

## 2019-11-16 NOTE — PROGRESS NOTE ADULT - SUBJECTIVE AND OBJECTIVE BOX
Vascular & Interventional Radiology Pre-Procedure Note    Procedure Name: Uterine artery embolization    HPI: 29y Female with c section scar ectopic pregnancy. Pre-operative uterine artery embolization is requested by the ob/gyn team.     Allergies:   Medications (Abx/Cardiac/Anticoagulation/Blood Products)      Data:  165.1  81.6  T(C): 36.7  HR: 81  BP: 97/63  RR: 18  SpO2: 100%    -WBC 7.29 / HgB 11.7 / Hct 33.9 / Plt 242  -Na 139 / Cl 105 / BUN 6 / Glucose 90  -K 4.1 / CO2 24 / Cr 0.43  -ALT -- / Alk Phos -- / T.Bili --  -INR1.02      Plan:   -29y Female presents for uterine artery embolization. I discussed the procedure extensively with the patient including that it will most likely terminate her pregnancy. She agrees to the procedure as it is being recommended by her ob/gyn team prior to her surgery tomorrow. Other risks of the procedure including the ability for her to get pregnant in the future, damage to blood vessels, and uterine necrosis were discussed. She agrees to the procedure and signed consent. I discussed this case personally with the Ob/gyn attending who is ordering the procedure.   -Risks/Benefits/alternatives explained with the patient and/or healthcare proxy and witnessed informed consent obtained.

## 2019-11-16 NOTE — PROGRESS NOTE ADULT - ASSESSMENT
28 y/o  at 10w0d w/ GAVI 20 by early sono presenting with vaginal bleeding found to have  scar ectopic measuring 10w3d on sono. Patient admitted for surgical management of high risk early pregnancy. No episodes of bleeding overnight. Plan for UAE with IR on Monday or sooner if patient has acute episode of bleeding. Surgical intervention options: D&C under ultrasound and dx LSC guidance vs. wedge resection vs. hysterectomy.

## 2019-11-16 NOTE — CHART NOTE - NSCHARTNOTEFT_GEN_A_CORE
R2 OBGYN POST-OP CHECK    S: Patient seen and evaluated at bedside recently postop from [facility].  Pt awake and alert resting comfortably in bed.  Patient reports pain controlled with analgesia. Pt denies N/V, SOB, CP, palpitations, fever/chills. Not OOB yet. Per IR patient can move after 4 hours. Patient is NPO and schedule for dx laparoscopy     O:   T(C): 36.5 (19 @ 18:36), Max: 36.5 (19 @ 18:36)  HR: 85 (19 @ 18:36) (73 - 93)  BP: 97/66 (19 @ 18:36) (91/50 - 104/67)  RR: 18 (19 @ 18:36) (16 - 18)  SpO2: 100% (19 @ 18:36) (97% - 100%)  Wt(kg): --  I&O's Summary    2019 07:01  -  2019 20:19  --------------------------------------------------------  IN: 250 mL / OUT: 100 mL / NET: 150 mL        Gen: Resting comfortably in bed, NAD  CV: S1S2, RRR  Lungs: CTA B/L  Abd: soft, non tender, non distended.   R Groin site: CDI  Ext: SCD's in place and functional, non-tender b/l, no edema        A/P: 29y Female with  scar pregnancy s/p bilateral Uterine Artery Embolization scheduled for diagnostic laparoscopy possible hysteroscopy possible D&C with suction, possible wedge resection possible hysterectomy tomorrow     Neuro: PCA for pain control  CV: Hemodynamically stable.  Monitor VS. CBC in AM.   Pulm: Saturating well on room air.   GI: NPO   : Voiding spontaneously  FEN: Electrolytes: NS@125cc/hr. BMP in AM.   Heme: DVT ppx w/ SCD's while in bed. Early ambulation, initially with assistance then as tolerated.  Continue HSQ   ID: Afebrile  Endo: No active issues   Dispo: For procedure tomorrow     oZe Richards PGY-2  d/w Dr. Lewis [facility]

## 2019-11-16 NOTE — PROGRESS NOTE ADULT - SUBJECTIVE AND OBJECTIVE BOX
GYN Progress Note    HD#2    Patient seen and examined at bedside. No acute events overnight. No acute complaints. No vaginal bleeding overnight. No abdominal pain. Additional ROS negative.  Vital Signs Last 24 Hours  T(C): 36.8 (11-16-19 @ 05:29), Max: 37.1 (11-15-19 @ 15:57)  HR: 78 (11-16-19 @ 05:29) (75 - 98)  BP: 93/61 (11-16-19 @ 05:29) (93/61 - 110/69)  RR: 18 (11-16-19 @ 05:29) (16 - 18)  SpO2: 99% (11-16-19 @ 05:29) (98% - 100%)    I&O's Summary      Physical Exam:  General: NAD  CV: RRR  Lungs: CTAB  Abdomen: soft, non-tender  : no bleeding on pad  Ext: no pain or swelling     Labs:                        10.7   7.35  )-----------( 227      ( 15 Nov 2019 15:49 )             32.3   baso 0.3    eos 0.3    imm gran 0.8    lymph 26.0   mono 4.8    poly 67.8       MEDICATIONS  (STANDING):  lactated ringers. 1000 milliLiter(s) (125 mL/Hr) IV Continuous <Continuous>    MEDICATIONS  (PRN):

## 2019-11-16 NOTE — CHART NOTE - NSCHARTNOTEFT_GEN_A_CORE
I have seen and examined this patient and have reviewed her imaging with Dr. Bhagat and we believe this a  scar pregnancy at this time. I have discussed surgical planning with Dr. Cleveland. Patient counseled on:  - Risk of attempting to continue current pregnancy to term including  pre-term delivery, maternal hemorrhage, maternal and fetal death.   - Patient counseled on recommendation for surgical intervention with D&C vs. wedge resection vs. hysterectomy given size and location of current ectopic pregnancy.   - Patient counseled on recommendation for uterine artery embolization prior to surgical intervention to decrease risk of hemorrhage.   - Discussed high likelihood of need for hysterectomy in this setting.   -Discussed that UAE would terminate this pregnancy and we would be unable to reverse this procedure     Patient and  voiced understanding of risks and benefits. All questions answered. Patient aware that emergent uterine artery embolization and hysterectomy may be required this admission in the setting of hemorrhage.     Plan:  -IR embolization today due to highly likely but unpredictable nature of hemorrhage in this situation   -NPO until surgery   -SCDS and ambulation  -Post to OR tomorrow for diagnostic laparoscopy, possible hysteroscopy, possible ablation, possible wedge resection, possible TLH/BS, possible laparotomy.    Brenda Chaney MD

## 2019-11-16 NOTE — CHART NOTE - NSCHARTNOTEFT_GEN_A_CORE
GYN IR Pre-Procedure Note    Patient age: 29    Patient gender: Female    Procedure: Uterine Artery Embolization    Diagnosis/Indication:  section scar pregnancy    Ordering Attending Physician: Dr. Cleveland    Pertinent medical history: 28y/o w/ 10w size  section scar pregnancy requiring uterine artery embolization prior to surgical resection of pregnancy tissue    Pertinent labs:                          11.7   7.29  )-----------( 242      ( 2019 10:06 )             33.9     PT/INR - ( 2019 07:42 )   PT: 11.7 sec;   INR: 1.02 ratio         PTT - ( 2019 07:42 )  PTT:27.7 sec    Patient and family aware? [x] Yes  [ ] No  IR Check list is in chart, which was prepared by the RN on shift  Patient is aware of procedure and has had the opportunity to ask questions.  IR Consent to be obtained by IR staff.  Pt has been NPO since midnight, all anticoagulation has been discontinued since admission.    DAYSI Vee PGY2  d/w Dr. Chaney & Cristofer

## 2019-11-16 NOTE — PROGRESS NOTE ADULT - SUBJECTIVE AND OBJECTIVE BOX
Interventional Radiology Brief- Operative Note    Procedure: Bilateral Uterine Artery Embolization    Operators: Dr. Perez; Dr. Dobson    Anesthesia (type): MAC    Contrast: 100 cc Ominpaque 240    EBL: 0 cc    Findings/Follow up Plan of Care: Patent bilateral uterine arteries. Successful gelfoam embolization of bilateral uterine arteries    Specimens Removed: None    Implants: Gelfoam    Complications: None    Condition/Disposition: Stable/Floors    Please call Interventional Radiology x [phone] with any questions, concerns, or issues. Interventional Radiology Brief- Operative Note    Procedure: Bilateral Uterine Artery Embolization    Operators: Dr. Perez; Dr. Dobson    Anesthesia (type): MAC    Contrast: 100 cc Ominpaque 240    EBL: 0 cc    Findings/Follow up Plan of Care:  Successful gelfoam embolization of bilateral uterine arteries. Hemostasis obtained from right common femoral artery access site using manual compression.     Specimens Removed: None    Implants: Gelfoam    Complications: None    Condition/Disposition: To recovery room.     Please call Interventional Radiology x [phone] with any questions, concerns, or issues.

## 2019-11-17 LAB
ANION GAP SERPL CALC-SCNC: 13 MMOL/L — SIGNIFICANT CHANGE UP (ref 5–17)
APTT BLD: 26.5 SEC — LOW (ref 27.5–36.3)
BUN SERPL-MCNC: 4 MG/DL — LOW (ref 7–23)
CALCIUM SERPL-MCNC: 8.1 MG/DL — LOW (ref 8.4–10.5)
CHLORIDE SERPL-SCNC: 102 MMOL/L — SIGNIFICANT CHANGE UP (ref 96–108)
CO2 SERPL-SCNC: 20 MMOL/L — LOW (ref 22–31)
CREAT SERPL-MCNC: 0.47 MG/DL — LOW (ref 0.5–1.3)
GLUCOSE SERPL-MCNC: 81 MG/DL — SIGNIFICANT CHANGE UP (ref 70–99)
HCT VFR BLD CALC: 30.3 % — LOW (ref 34.5–45)
HCT VFR BLD CALC: 30.5 % — LOW (ref 34.5–45)
HGB BLD-MCNC: 10.2 G/DL — LOW (ref 11.5–15.5)
HGB BLD-MCNC: 9.8 G/DL — LOW (ref 11.5–15.5)
INR BLD: 1.06 RATIO — SIGNIFICANT CHANGE UP (ref 0.88–1.16)
MCHC RBC-ENTMCNC: 28.1 PG — SIGNIFICANT CHANGE UP (ref 27–34)
MCHC RBC-ENTMCNC: 28.7 PG — SIGNIFICANT CHANGE UP (ref 27–34)
MCHC RBC-ENTMCNC: 32.1 GM/DL — SIGNIFICANT CHANGE UP (ref 32–36)
MCHC RBC-ENTMCNC: 33.7 GM/DL — SIGNIFICANT CHANGE UP (ref 32–36)
MCV RBC AUTO: 85.1 FL — SIGNIFICANT CHANGE UP (ref 80–100)
MCV RBC AUTO: 87.4 FL — SIGNIFICANT CHANGE UP (ref 80–100)
NRBC # BLD: 0 /100 WBCS — SIGNIFICANT CHANGE UP (ref 0–0)
NRBC # BLD: 0 /100 WBCS — SIGNIFICANT CHANGE UP (ref 0–0)
PLATELET # BLD AUTO: 181 K/UL — SIGNIFICANT CHANGE UP (ref 150–400)
PLATELET # BLD AUTO: 184 K/UL — SIGNIFICANT CHANGE UP (ref 150–400)
POTASSIUM SERPL-MCNC: 3.9 MMOL/L — SIGNIFICANT CHANGE UP (ref 3.5–5.3)
POTASSIUM SERPL-SCNC: 3.9 MMOL/L — SIGNIFICANT CHANGE UP (ref 3.5–5.3)
PROTHROM AB SERPL-ACNC: 12.2 SEC — SIGNIFICANT CHANGE UP (ref 10–12.9)
RBC # BLD: 3.49 M/UL — LOW (ref 3.8–5.2)
RBC # BLD: 3.56 M/UL — LOW (ref 3.8–5.2)
RBC # FLD: 13.7 % — SIGNIFICANT CHANGE UP (ref 10.3–14.5)
RBC # FLD: 13.8 % — SIGNIFICANT CHANGE UP (ref 10.3–14.5)
SODIUM SERPL-SCNC: 135 MMOL/L — SIGNIFICANT CHANGE UP (ref 135–145)
WBC # BLD: 10.17 K/UL — SIGNIFICANT CHANGE UP (ref 3.8–10.5)
WBC # BLD: 6.99 K/UL — SIGNIFICANT CHANGE UP (ref 3.8–10.5)
WBC # FLD AUTO: 10.17 K/UL — SIGNIFICANT CHANGE UP (ref 3.8–10.5)
WBC # FLD AUTO: 6.99 K/UL — SIGNIFICANT CHANGE UP (ref 3.8–10.5)

## 2019-11-17 PROCEDURE — 52000 CYSTOURETHROSCOPY: CPT | Mod: 59

## 2019-11-17 PROCEDURE — 52000 CYSTOURETHROSCOPY: CPT | Mod: 59,62

## 2019-11-17 PROCEDURE — 88305 TISSUE EXAM BY PATHOLOGIST: CPT | Mod: 26

## 2019-11-17 PROCEDURE — 88307 TISSUE EXAM BY PATHOLOGIST: CPT | Mod: 26

## 2019-11-17 PROCEDURE — 88302 TISSUE EXAM BY PATHOLOGIST: CPT | Mod: 26

## 2019-11-17 PROCEDURE — 72196 MRI PELVIS W/DYE: CPT | Mod: 26

## 2019-11-17 PROCEDURE — 58543 LSH UTERUS ABOVE 250 G: CPT

## 2019-11-17 PROCEDURE — 58543 LSH UTERUS ABOVE 250 G: CPT | Mod: 62

## 2019-11-17 PROCEDURE — 74182 MRI ABDOMEN W/CONTRAST: CPT | Mod: 26

## 2019-11-17 RX ORDER — ACETAMINOPHEN 500 MG
1000 TABLET ORAL ONCE
Refills: 0 | Status: COMPLETED | OUTPATIENT
Start: 2019-11-17 | End: 2019-11-17

## 2019-11-17 RX ORDER — HYDROMORPHONE HYDROCHLORIDE 2 MG/ML
0.5 INJECTION INTRAMUSCULAR; INTRAVENOUS; SUBCUTANEOUS
Refills: 0 | Status: DISCONTINUED | OUTPATIENT
Start: 2019-11-17 | End: 2019-11-17

## 2019-11-17 RX ORDER — ONDANSETRON 8 MG/1
4 TABLET, FILM COATED ORAL ONCE
Refills: 0 | Status: DISCONTINUED | OUTPATIENT
Start: 2019-11-17 | End: 2019-11-17

## 2019-11-17 RX ORDER — HEPARIN SODIUM 5000 [USP'U]/ML
5000 INJECTION INTRAVENOUS; SUBCUTANEOUS EVERY 12 HOURS
Refills: 0 | Status: DISCONTINUED | OUTPATIENT
Start: 2019-11-17 | End: 2019-11-19

## 2019-11-17 RX ORDER — SODIUM CHLORIDE 9 MG/ML
1000 INJECTION, SOLUTION INTRAVENOUS
Refills: 0 | Status: DISCONTINUED | OUTPATIENT
Start: 2019-11-17 | End: 2019-11-19

## 2019-11-17 RX ORDER — OXYCODONE HYDROCHLORIDE 5 MG/1
5 TABLET ORAL EVERY 4 HOURS
Refills: 0 | Status: DISCONTINUED | OUTPATIENT
Start: 2019-11-17 | End: 2019-11-19

## 2019-11-17 RX ORDER — IBUPROFEN 200 MG
400 TABLET ORAL EVERY 6 HOURS
Refills: 0 | Status: DISCONTINUED | OUTPATIENT
Start: 2019-11-17 | End: 2019-11-18

## 2019-11-17 RX ORDER — ACETAMINOPHEN 500 MG
975 TABLET ORAL EVERY 6 HOURS
Refills: 0 | Status: DISCONTINUED | OUTPATIENT
Start: 2019-11-17 | End: 2019-11-19

## 2019-11-17 RX ORDER — ACETAMINOPHEN 500 MG
1000 TABLET ORAL ONCE
Refills: 0 | Status: DISCONTINUED | OUTPATIENT
Start: 2019-11-17 | End: 2019-11-17

## 2019-11-17 RX ADMIN — Medication 400 MILLIGRAM(S): at 06:46

## 2019-11-17 RX ADMIN — Medication 1000 MILLIGRAM(S): at 02:00

## 2019-11-17 RX ADMIN — OXYCODONE HYDROCHLORIDE 5 MILLIGRAM(S): 5 TABLET ORAL at 19:41

## 2019-11-17 RX ADMIN — HYDROMORPHONE HYDROCHLORIDE 0.5 MILLIGRAM(S): 2 INJECTION INTRAMUSCULAR; INTRAVENOUS; SUBCUTANEOUS at 18:15

## 2019-11-17 RX ADMIN — HYDROMORPHONE HYDROCHLORIDE 0.5 MILLIGRAM(S): 2 INJECTION INTRAMUSCULAR; INTRAVENOUS; SUBCUTANEOUS at 18:00

## 2019-11-17 RX ADMIN — OXYCODONE HYDROCHLORIDE 5 MILLIGRAM(S): 5 TABLET ORAL at 20:00

## 2019-11-17 RX ADMIN — Medication 1000 MILLIGRAM(S): at 20:06

## 2019-11-17 RX ADMIN — Medication 400 MILLIGRAM(S): at 20:16

## 2019-11-17 RX ADMIN — Medication 400 MILLIGRAM(S): at 01:30

## 2019-11-17 RX ADMIN — Medication 400 MILLIGRAM(S): at 18:44

## 2019-11-17 NOTE — BRIEF OPERATIVE NOTE - OPERATION/FINDINGS
Diagnostic laparoscopy revealed bulging anterior MELIDA, with bladder flap defined separate from uterine wall  Suction curettage performed under laparoscopy guidance. Ultrasound showed suspicion for retained products, unable to be retrieved via suction curettage. Decision made to proceed with supracervical hysterectomy and bilateral salpingectomy.  Evicel placed on operative sites. Hemostasis achieved Diagnostic laparoscopy revealed midline omental adhesion taken down without incident, bulging anterior MELIDA, with bladder flap defined separate from uterine wall.  Suction curettage performed under laparoscopy guidance. Ultrasound showed suspicion for retained products, unable to be retrieved via suction curettage. Decision made to proceed with supracervical hysterectomy and bilateral salpingectomy.  Evicel placed on operative sites. Hemostasis achieved

## 2019-11-17 NOTE — BRIEF OPERATIVE NOTE - NSICDXBRIEFPREOP_GEN_ALL_CORE_FT
PRE-OP DIAGNOSIS:  Ectopic pregnancy 2019 17:54:58  scar pregnancy Duane Lozada  Uterine scar from previous  delivery complicating pregnancy 2019 17:54:48  scar pregnancy Duane Lozada

## 2019-11-17 NOTE — CHART NOTE - NSCHARTNOTEFT_GEN_A_CORE
R2 OBGYN POST-OP CHECK    S: Patient seen and evaluated at bedside.  Pt awake and alert resting comfortably in bed vs.   Patient reports pain controlled with analgesia. Pt denies N/V, SOB, CP, palpitations, fever/chills. Not yet tried clears.  Not OOB yet.    O:   T(C): 36.2 (11-17-19 @ 18:45), Max: 36.2 (11-17-19 @ 17:40)  HR: 94 (11-17-19 @ 19:45) (82 - 95)  BP: 107/66 (11-17-19 @ 19:45) (107/66 - 116/66)  RR: 16 (11-17-19 @ 19:45) (15 - 16)  SpO2: 100% (11-17-19 @ 19:45) (99% - 100%)  Wt(kg): --  I&O's Summary    16 Nov 2019 07:01  -  17 Nov 2019 07:00  --------------------------------------------------------  IN: 250 mL / OUT: 950 mL / NET: -700 mL    17 Nov 2019 07:01  -  17 Nov 2019 19:52  --------------------------------------------------------  IN: 125 mL / OUT: 50 mL / NET: 75 mL        Gen: Resting comfortably in bed, NAD  CV: S1S2, RRR  Lungs: CTA B/L  Abd: soft, appropriately tender, occasional BS x 4 quadrants.    Inc: port sites Clean/dry/intact w/ steris in place  Ext: SCD's in place and functional, non-tender b/l, no edema        A/P: 29y Female s/p diagnostic laparoscopy, suction dilation and curettage with concern for retained POC, JERRY, BS, overseweing of bladder. Pt it stable and recovery well in PACU.    Neuro: PO Analgesia PRN  CV: Hemodynamically stable.  Monitor VS. CBC in AM.   Pulm: Saturating well on room air.  Encourage OOB and incentive spirometer use.   GI: Advance to regular diet. Anti-emetics PRN.  : Mckoy to gravity. Evaluate for removal in AM   FEN: Electrolytes: LR@125cc/hr. BMP in AM.   Heme: DVT ppx w/ SCD's while in bed. Early ambulation, initially with assistance then as tolerated.  Continue HSQ   ID: Afebrile  Endo: No active issues   Dispo: Discharge from PACU when criteria met.     Jasmine, PGY2 R2 OBGYN POST-OP CHECK    S: Patient seen and evaluated at bedside.  Pt awake and alert resting comfortably in bed vs.   Patient reports pain controlled with analgesia. Pt denies N/V, SOB, CP, palpitations, fever/chills. Not yet tried clears.  Not OOB yet.    O:   T(C): 36.2 (19 @ 18:45), Max: 36.2 (19 @ 17:40)  HR: 94 (19 @ 19:45) (82 - 95)  BP: 107/66 (19 @ 19:45) (107/66 - 116/66)  RR: 16 (19 @ 19:45) (15 - 16)  SpO2: 100% (19 @ 19:45) (99% - 100%)  Wt(kg): --  I&O's Summary    2019 07:  -  2019 07:00  --------------------------------------------------------  IN: 250 mL / OUT: 950 mL / NET: -700 mL    2019 07:  -  2019 19:52  --------------------------------------------------------  IN: 125 mL / OUT: 50 mL / NET: 75 mL        Gen: Resting comfortably in bed, NAD  CV: S1S2, RRR  Lungs: CTA B/L  Abd: soft, appropriately tender, occasional BS x 4 quadrants.    Inc: port sites Clean/dry/intact w/ steris in place  Ext: SCD's in place and functional, non-tender b/l, no edema        A/P: 29y Female s/p diagnostic laparoscopy, suction dilation and curettage with concern for retained POC, JERRY, BS, overseweing of bladder for  scar ectopic pregnancy. Pt it stable and recovering well in PACU.    Neuro: PO Analgesia PRN  CV: Hemodynamically stable.  Monitor VS. CBC in AM.   Pulm: Saturating well on room air.  Encourage OOB and incentive spirometer use.   GI: Advance to regular diet. Anti-emetics PRN.  : Mckoy to gravity. Evaluate for removal in AM   FEN: Electrolytes: LR@125cc/hr. BMP in AM.   Heme: DVT ppx w/ SCD's while in bed. Early ambulation, initially with assistance then as tolerated.  Continue HSQ   ID: Afebrile  Endo: No active issues   Dispo: Discharge from PACU when criteria met.     Jasmine, PGY2

## 2019-11-17 NOTE — BRIEF OPERATIVE NOTE - NSICDXBRIEFPOSTOP_GEN_ALL_CORE_FT
POST-OP DIAGNOSIS:  Uterine scar from previous  delivery, with delivery 2019 17:55:52  scar pregnancy Duane Lozada  Placenta accreta 2019 17:55:22  Duane Lozada

## 2019-11-17 NOTE — BRIEF OPERATIVE NOTE - NSICDXBRIEFPROCEDURE_GEN_ALL_CORE_FT
PROCEDURES:  Laparoscopy with dilation and curettage of uterus using suction 17-Nov-2019 17:53:37  Duane Lozada  Bilateral salpingectomy 17-Nov-2019 17:53:04  Duane Lozada  Laparoscopic supracervical hysterectomy with cystoscopy 17-Nov-2019 17:52:15  Duane Lozada  Diagnostic laparoscopy 17-Nov-2019 17:52:05  Duane Lozada

## 2019-11-17 NOTE — PROGRESS NOTE ADULT - SUBJECTIVE AND OBJECTIVE BOX
GYN Progress Note     HD#3    Patient seen and examined at bedside. s/p UAE yesterday. No acute events overnight. No acute complaints.  Pain well controlled. Patient is not yet ambulating. NPO for OR today. Mckoy still in place. No vaginal bleeding overnight per patient. Denies CP, SOB, N/V, fevers, and chills.    Vital Signs Last 24 Hours  T(C): 36.8 (11-17-19 @ 01:04), Max: 36.8 (11-17-19 @ 01:04)  HR: 80 (11-17-19 @ 01:04) (73 - 93)  BP: 90/60 (11-17-19 @ 01:04) (90/60 - 105/65)  RR: 18 (11-17-19 @ 01:04) (16 - 18)  SpO2: 100% (11-17-19 @ 01:04) (97% - 100%)    I&O's Summary    16 Nov 2019 07:01  -  17 Nov 2019 06:54  --------------------------------------------------------  IN: 250 mL / OUT: 950 mL / NET: -700 mL      Physical Exam:  General: NAD  CV: RRR  Lungs: CTAB  Abdomen: soft, non-tender  Incision: right groin incision C/D/I with dressing in place  Ext: no pain or swelling     Labs:                        11.7   7.29  )-----------( 242      ( 16 Nov 2019 10:06 )             33.9   baso x      eos x      imm gran x      lymph x      mono x      poly x                            10.7   7.35  )-----------( 227      ( 15 Nov 2019 15:49 )             32.3   baso 0.3    eos 0.3    imm gran 0.8    lymph 26.0   mono 4.8    poly 67.8       MEDICATIONS  (STANDING):  acetaminophen  IVPB .. 1000 milliGRAM(s) IV Intermittent once  HYDROmorphone PCA (1 mG/mL) 30 milliLiter(s) PCA Continuous PCA Continuous  sodium chloride 0.9%. 1000 milliLiter(s) (125 mL/Hr) IV Continuous <Continuous>    MEDICATIONS  (PRN):  HYDROmorphone PCA (1 mG/mL) Rescue Clinician Bolus 0.5 milliGRAM(s) IV Push every 15 minutes PRN for Pain Scale GREATER THAN 6  naloxone Injectable 0.1 milliGRAM(s) IV Push every 3 minutes PRN For ANY of the following changes in patient status:  A. RR LESS THAN 10 breaths per minute, B. Oxygen saturation LESS THAN 90%, C. Sedation score of 6  ondansetron Injectable 4 milliGRAM(s) IV Push every 6 hours PRN Nausea

## 2019-11-17 NOTE — PROGRESS NOTE ADULT - ASSESSMENT
30 y/o  at 10w0d w/ GAVI 20 by early sono presenting with vaginal bleeding found to have  scar ectopic measuring 10w3d on sono. Patient admitted for surgical management of high risk early pregnancy. s/p UAE yesterday. No vaginal bleeding overnight. Pending OR today for diagnostic laparoscopy, possible D&C with suction, possible hysteroscopy, possible wedge resection, possible TLH/BS, possible ex-lap.

## 2019-11-18 ENCOUNTER — TRANSCRIPTION ENCOUNTER (OUTPATIENT)
Age: 29
End: 2019-11-18

## 2019-11-18 ENCOUNTER — APPOINTMENT (OUTPATIENT)
Dept: OBGYN | Facility: CLINIC | Age: 29
End: 2019-11-18

## 2019-11-18 DIAGNOSIS — Z98.890 OTHER SPECIFIED POSTPROCEDURAL STATES: ICD-10-CM

## 2019-11-18 LAB
HCT VFR BLD CALC: 23.4 % — LOW (ref 34.5–45)
HCT VFR BLD CALC: 24.9 % — LOW (ref 34.5–45)
HCT VFR BLD CALC: 25 % — LOW (ref 34.5–45)
HGB BLD-MCNC: 7.9 G/DL — LOW (ref 11.5–15.5)
HGB BLD-MCNC: 8.4 G/DL — LOW (ref 11.5–15.5)
HGB BLD-MCNC: 8.5 G/DL — LOW (ref 11.5–15.5)
MCHC RBC-ENTMCNC: 28.8 PG — SIGNIFICANT CHANGE UP (ref 27–34)
MCHC RBC-ENTMCNC: 28.8 PG — SIGNIFICANT CHANGE UP (ref 27–34)
MCHC RBC-ENTMCNC: 28.9 PG — SIGNIFICANT CHANGE UP (ref 27–34)
MCHC RBC-ENTMCNC: 33.7 GM/DL — SIGNIFICANT CHANGE UP (ref 32–36)
MCHC RBC-ENTMCNC: 33.8 GM/DL — SIGNIFICANT CHANGE UP (ref 32–36)
MCHC RBC-ENTMCNC: 34 GM/DL — SIGNIFICANT CHANGE UP (ref 32–36)
MCV RBC AUTO: 84.7 FL — SIGNIFICANT CHANGE UP (ref 80–100)
MCV RBC AUTO: 85.4 FL — SIGNIFICANT CHANGE UP (ref 80–100)
MCV RBC AUTO: 85.6 FL — SIGNIFICANT CHANGE UP (ref 80–100)
NRBC # BLD: 0 /100 WBCS — SIGNIFICANT CHANGE UP (ref 0–0)
PLATELET # BLD AUTO: 177 K/UL — SIGNIFICANT CHANGE UP (ref 150–400)
PLATELET # BLD AUTO: 184 K/UL — SIGNIFICANT CHANGE UP (ref 150–400)
PLATELET # BLD AUTO: 186 K/UL — SIGNIFICANT CHANGE UP (ref 150–400)
RBC # BLD: 2.74 M/UL — LOW (ref 3.8–5.2)
RBC # BLD: 2.91 M/UL — LOW (ref 3.8–5.2)
RBC # BLD: 2.95 M/UL — LOW (ref 3.8–5.2)
RBC # FLD: 13.9 % — SIGNIFICANT CHANGE UP (ref 10.3–14.5)
RBC # FLD: 14.1 % — SIGNIFICANT CHANGE UP (ref 10.3–14.5)
RBC # FLD: 14.3 % — SIGNIFICANT CHANGE UP (ref 10.3–14.5)
WBC # BLD: 6.91 K/UL — SIGNIFICANT CHANGE UP (ref 3.8–10.5)
WBC # BLD: 7.17 K/UL — SIGNIFICANT CHANGE UP (ref 3.8–10.5)
WBC # BLD: 7.2 K/UL — SIGNIFICANT CHANGE UP (ref 3.8–10.5)
WBC # FLD AUTO: 6.91 K/UL — SIGNIFICANT CHANGE UP (ref 3.8–10.5)
WBC # FLD AUTO: 7.17 K/UL — SIGNIFICANT CHANGE UP (ref 3.8–10.5)
WBC # FLD AUTO: 7.2 K/UL — SIGNIFICANT CHANGE UP (ref 3.8–10.5)

## 2019-11-18 PROCEDURE — 99231 SBSQ HOSP IP/OBS SF/LOW 25: CPT

## 2019-11-18 RX ORDER — SIMETHICONE 80 MG/1
80 TABLET, CHEWABLE ORAL EVERY 4 HOURS
Refills: 0 | Status: DISCONTINUED | OUTPATIENT
Start: 2019-11-18 | End: 2019-11-19

## 2019-11-18 RX ORDER — IBUPROFEN 200 MG
600 TABLET ORAL EVERY 6 HOURS
Refills: 0 | Status: DISCONTINUED | OUTPATIENT
Start: 2019-11-18 | End: 2019-11-19

## 2019-11-18 RX ORDER — OXYCODONE HYDROCHLORIDE 5 MG/1
1 TABLET ORAL
Qty: 20 | Refills: 0
Start: 2019-11-18 | End: 2019-11-22

## 2019-11-18 RX ADMIN — Medication 600 MILLIGRAM(S): at 17:32

## 2019-11-18 RX ADMIN — SODIUM CHLORIDE 125 MILLILITER(S): 9 INJECTION, SOLUTION INTRAVENOUS at 08:55

## 2019-11-18 RX ADMIN — Medication 975 MILLIGRAM(S): at 17:29

## 2019-11-18 RX ADMIN — Medication 975 MILLIGRAM(S): at 12:06

## 2019-11-18 RX ADMIN — Medication 400 MILLIGRAM(S): at 05:30

## 2019-11-18 RX ADMIN — Medication 600 MILLIGRAM(S): at 12:06

## 2019-11-18 RX ADMIN — SIMETHICONE 80 MILLIGRAM(S): 80 TABLET, CHEWABLE ORAL at 03:07

## 2019-11-18 RX ADMIN — Medication 975 MILLIGRAM(S): at 17:32

## 2019-11-18 RX ADMIN — SIMETHICONE 80 MILLIGRAM(S): 80 TABLET, CHEWABLE ORAL at 06:20

## 2019-11-18 RX ADMIN — HEPARIN SODIUM 5000 UNIT(S): 5000 INJECTION INTRAVENOUS; SUBCUTANEOUS at 17:29

## 2019-11-18 RX ADMIN — Medication 975 MILLIGRAM(S): at 06:21

## 2019-11-18 RX ADMIN — Medication 975 MILLIGRAM(S): at 00:35

## 2019-11-18 RX ADMIN — Medication 600 MILLIGRAM(S): at 17:29

## 2019-11-18 RX ADMIN — HEPARIN SODIUM 5000 UNIT(S): 5000 INJECTION INTRAVENOUS; SUBCUTANEOUS at 06:22

## 2019-11-18 RX ADMIN — Medication 975 MILLIGRAM(S): at 01:10

## 2019-11-18 RX ADMIN — Medication 400 MILLIGRAM(S): at 04:42

## 2019-11-18 NOTE — PROGRESS NOTE ADULT - SUBJECTIVE AND OBJECTIVE BOX
Interventional Radiology Follow- Up Note      This is a 29y Female s/p b/l UAE on 11/16/19 in Interventional Radiology with Dr. Perez.     Patient seen and examined @ bedside. She is most recently s/p laparoscopic hysterectomy yesterday. She reports mild abdominal pain and right groin pain (at puncture site). She states she is experiencing some lightheadedness when sitting upright. She has not had a bowel movement and feels "very gassy". +Tolerating PO intake. Denies nausea/ vomiting.      Reports having some vaginal bleeding/ spotting.     PAST MEDICAL & SURGICAL HISTORY:  Miscarriage: 2016  No significant past surgical history    Allergies: No Known Allergies    LABS:                        7.9    6.91  )-----------( 177      ( 18 Nov 2019 10:32 )             23.4     11-17    135  |  102  |  4<L>  ----------------------------<  81  3.9   |  20<L>  |  0.47<L>    Ca    8.1<L>      17 Nov 2019 07:24      PT/INR - ( 17 Nov 2019 07:30 )   PT: 12.2 sec;   INR: 1.06 ratio         PTT - ( 17 Nov 2019 07:30 )  PTT:26.5 sec    Vitals: T(F): 97.5 (11-18-19 @ 12:10), Max: 99.7 (11-18-19 @ 05:35)  HR: 94 (11-18-19 @ 12:10) (75 - 95)  BP: 116/75 (11-18-19 @ 12:10) (94/61 - 116/75)  RR: 18 (11-18-19 @ 12:10) (15 - 18)  SpO2: 99% (11-18-19 @ 12:10) (97% - 100%)    PHYSICAL EXAM:  General: Nontoxic, in NAD, A&O x3  Abd: ND, soft, NTTP. Right groin soft, TTP. No evidence of hematoma +2 b/l femoral/ DP/ PT pulses.     A/P: 29y Female admitted with scar ectopic pregnancy s/p b/l UAE with Dr. Perez on 11/16/19. She is currently most recently s/p hysterectomy.   -Continue adequate pain control  -Transfuse PRN  -Continue global care per GYN team  -Contact info given to patient  -Will sign off. Appreciate IR referral. Please consult IR as needed.  -Case d/w Dr. Perez  -Please call IR at extension [phone] with any questions, concerns, or issues regarding above.

## 2019-11-18 NOTE — DISCHARGE NOTE PROVIDER - NSDCFUSCHEDAPPT_GEN_ALL_CORE_FT
DAY CLEARY ; 11/18/2019 ; NPP OB/GYN  [facility] DAY CLEARY ; 11/18/2019 ; NPP OB/GYN  [facility]

## 2019-11-18 NOTE — DISCHARGE NOTE PROVIDER - CARE PROVIDER_API CALL
[facility],   Phone: [phone]  Fax: (   )    -  Follow Up Time: 2 weeks [facility],   Phone: [phone]  Fax: (   )    -  Follow Up Time: 2 weeks    John Cleveland)  Obstetrics and Gynecology  [address] [place] [address]  Phone: [phone]  Fax: [phone]  Follow Up Time:

## 2019-11-18 NOTE — DISCHARGE NOTE PROVIDER - NSDCMRMEDTOKEN_GEN_ALL_CORE_FT
acetaminophen 325 mg oral tablet: 3 tab(s) orally every 6 hours  ibuprofen 600 mg oral tablet: 1 tab(s) orally every 6 hours  oxyCODONE 5 mg oral capsule: 1 cap(s) orally every 6 hours MDD:4 caps details…

## 2019-11-18 NOTE — PROGRESS NOTE ADULT - SUBJECTIVE AND OBJECTIVE BOX
GYN PA Note     Patient seen and examined at bedside. Pain well controlled, pt c/o feeling lightheaded when she gets up to ambulate, denies SOB, CP or palpitations.  Patient tolerating regular diet.  Has not yet passed flatus. Leelee removed this am and due to void by 5pm    Vital Signs Last 24 Hours  T(C): 36.4 (19 @ 12:10), Max: 37.6 (19 @ 05:35)  HR: 94 (19 @ 12:10) (75 - 95)  BP: 116/75 (19 @ 12:10) (94/61 - 116/75)  RR: 18 (19 @ 12:10) (15 - 18)  SpO2: 99% (19 @ 12:10) (97% - 100%)    I&O's Summary    2019 07:  -  2019 07:00  --------------------------------------------------------  IN: 250 mL / OUT: 1925 mL / NET: -1675 mL    2019 07:  -  2019 12:36  --------------------------------------------------------  IN: 0 mL / OUT: 1300 mL / NET: -1300 mL        Physical Exam:  General: NAD  CV: RRR  Lungs: CTA-B/L  Abdomen: Soft, non-tender, non-distended, +BS  Incision: 3 small incision sites C/D/I covered with steri strips  Ext: No pain or swelling    Labs:             7.9    6.91  )-----------( 177      (  @ 10:32 )             23.4                8.5    7.20  )-----------( 184      (  @ 07:33 )             25.0                9.8    10.17 )-----------( 181      (  @ 17:56 )             30.5                10.2   6.99  )-----------( 184      (  @ 07:21 )             30.3                11.7   7.29  )-----------( 242      (  @ 10:06 )             33.9                10.7   7.35  )-----------( 227      ( 11-15 @ 15:49 )             32.3         MEDICATIONS  (STANDING):  acetaminophen   Tablet .. 975 milliGRAM(s) Oral every 6 hours  heparin  Injectable 5000 Unit(s) SubCutaneous every 12 hours  ibuprofen  Tablet. 600 milliGRAM(s) Oral every 6 hours  lactated ringers. 1000 milliLiter(s) (125 mL/Hr) IV Continuous <Continuous>    MEDICATIONS  (PRN):  oxyCODONE    IR 5 milliGRAM(s) Oral every 4 hours PRN Severe Pain (7 - 10)  simethicone 80 milliGRAM(s) Chew every 4 hours PRN Gas  Assessment and Plan:   · Assessment		  A/P: 29y POD#1 HD#4 a/w  scar pregnancy @10wk GA s/p unsucessful UAE () and diagnostic laparoscopy, DVC, Supracervical Hysterectomy, BS, cystoscopy and oversewing of bladder( ). Patient with anemia and c/o lightheadness with ambulation    Problem/Plan - 1:  ·  Problem: Postoperative state.  Plan: Neuro: PO pain meds; Motrin, tylenol, Oxycodone  CV: Pt with lightheadness when she ambulates and HCT 23.4, orthostatic BP's WNL, will repeat CBC at 2pm, pt doesnt want a transfusion at this time.  Pulm: Saturating well on room air, encourage oob/amb  GI: Continue regular diet  : Mckoy D/C'd at 9am due to void by 5pm  Heme: c/w HSQ and SCDs for DVT ppx  FEN: LR@125.   ID: Afebrile  Endo: No active issues   Dispo: Continue routine post-op care  D/W Dr Laurent Saravia PA-C GYN PA Note     Patient seen and examined at bedside. Pain well controlled, pt c/o feeling lightheaded when she gets up to ambulate, denies SOB, CP or palpitations.  Patient tolerating regular diet.  Has not yet passed flatus. Leelee removed this am and due to void by 5pm    Vital Signs Last 24 Hours  T(C): 36.4 (19 @ 12:10), Max: 37.6 (19 @ 05:35)  HR: 94 (19 @ 12:10) (75 - 95)  BP: 116/75 (19 @ 12:10) (94/61 - 116/75)  RR: 18 (19 @ 12:10) (15 - 18)  SpO2: 99% (19 @ 12:10) (97% - 100%)    I&O's Summary    2019 07:  -  2019 07:00  --------------------------------------------------------  IN: 250 mL / OUT: 1925 mL / NET: -1675 mL    2019 07:  -  2019 12:36  --------------------------------------------------------  IN: 0 mL / OUT: 1300 mL / NET: -1300 mL        Physical Exam:  General: NAD  CV: RRR  Lungs: CTA-B/L  Abdomen: Soft, non-tender, non-distended, +BS  Incision: 3 small incision sites C/D/I covered with steri strips  Ext: No pain or swelling    Labs:             7.9    6.91  )-----------( 177      (  @ 10:32 )             23.4                8.5    7.20  )-----------( 184      (  @ 07:33 )             25.0                9.8    10.17 )-----------( 181      (  @ 17:56 )             30.5                10.2   6.99  )-----------( 184      (  @ 07:21 )             30.3                11.7   7.29  )-----------( 242      (  @ 10:06 )             33.9                10.7   7.35  )-----------( 227      ( 11-15 @ 15:49 )             32.3         MEDICATIONS  (STANDING):  acetaminophen   Tablet .. 975 milliGRAM(s) Oral every 6 hours  heparin  Injectable 5000 Unit(s) SubCutaneous every 12 hours  ibuprofen  Tablet. 600 milliGRAM(s) Oral every 6 hours  lactated ringers. 1000 milliLiter(s) (125 mL/Hr) IV Continuous <Continuous>    MEDICATIONS  (PRN):  oxyCODONE    IR 5 milliGRAM(s) Oral every 4 hours PRN Severe Pain (7 - 10)  simethicone 80 milliGRAM(s) Chew every 4 hours PRN Gas  Assessment and Plan:   · Assessment		  A/P: 29y POD#1 HD#4 a/w  scar pregnancy @10wk GA s/p UAE () followed by diagnostic laparoscopy, DVC, Supracervical Hysterectomy, BS, cystoscopy and oversewing of bladder( ). Patient with anemia and c/o lightheadness with ambulation    Problem/Plan - 1:  ·  Problem: Postoperative state.  Plan: Neuro: PO pain meds; Motrin, tylenol, Oxycodone  CV: Pt with lightheadness when she ambulates and HCT 23.4, orthostatic BP's WNL, will repeat CBC at 2pm, pt doesnt want a transfusion at this time.  Pulm: Saturating well on room air, encourage oob/amb  GI: Continue regular diet  : Mckoy D/C'd at 9am due to void by 5pm  Heme: c/w HSQ and SCDs for DVT ppx  FEN: LR@125.   ID: Afebrile  Endo: No active issues   Dispo: Continue routine post-op care  D/W Dr Laurent Saravia PA-C

## 2019-11-18 NOTE — DISCHARGE NOTE PROVIDER - HOSPITAL COURSE
29yoF now  initially presented to the ED for vaginal bleeding that was later confirmed to be secondary to a ectopic pregnancy measuring 10w3d on sono in the  section scar. Repeat US performed by Dr. Bhagat of [facility] confirmed the diagnosis. A Uterine artery embolization was performed to decrease vaginal bleeding and underwent the procedure on HD#1. 29yoF now  initially presented to the ED for vaginal bleeding that was later confirmed to be secondary to a ectopic pregnancy measuring 10w3d on sono in the  section scar. Repeat US performed by Dr. Bhagat of [facility] confirmed the diagnosis. A Uterine artery embolization was performed to decrease vaginal bleeding and underwent the procedure on HD#1. Overnight patient had no bleeding and was in stable condition. On HD#2 Pt had a diagnostic laproscopy, dilation vaccuum curettage, Supracervical Hysterectomy, bilateral salpingectomy and oversewing of the bladder. Postoperatively patient recovered well. On POD#1, HD#3, patient was in stable condition, pain well controlled, and able to tolerate PO diet and void spontaneously. Pt to followup outpatient outpatient at [address] [place] [address], [address] Floor, [place] [address]. Please call the office for an appointment, [phone]. 29yoF now  initially presented to the ED for vaginal bleeding that was later confirmed to be secondary to a ectopic pregnancy measuring 10w3d on sono in the  section scar. Repeat US performed by Dr. Bhagat of [facility] confirmed the diagnosis. A Uterine artery embolization was performed to decrease vaginal bleeding and underwent the procedure on HD#1. Overnight patient had no bleeding and was in stable condition. On HD#2 Pt had a diagnostic laproscopy, dilation vaccuum curettage, Supracervical Hysterectomy, bilateral salpingectomy and oversewing of the bladder. Postoperatively patient recovered well. On HD#4, patient was in stable condition, pain well controlled, and able to tolerate PO diet and void spontaneously. Pt to followup outpatient outpatient at [address] [place] [address], [address] Floor, [place] [address]. Please call the office for an appointment, [phone].

## 2019-11-18 NOTE — PROGRESS NOTE ADULT - SUBJECTIVE AND OBJECTIVE BOX
R1 FRANCOIS Progress Note    POD#1   HD#4    Patient seen and examined at bedside.  No acute events overnight. No acute complaints.  Pain well controlled.  Patient is ambulating and tolerating a regular diet. Patient is passing flatus.    Lujan is still in place and to be removed later this morning.  Denies CP, SOB, N/V, fevers, and chills.    Vital Signs Last 24 Hours  T(C): 37.6 (19 @ 05:35), Max: 37.6 (19 @ 05:35)  HR: 93 (19 @ 05:35) (75 - 95)  BP: 105/66 (19 @ 05:35) (100/63 - 116/66)  RR: 18 (19 @ 05:35) (15 - 18)  SpO2: 97% (19 @ 05:35) (97% - 100%)    I&O's Summary    2019 07:  -  2019 07:00  --------------------------------------------------------  IN: 250 mL / OUT: 950 mL / NET: -700 mL    2019 07:01  -  2019 06:48  --------------------------------------------------------  IN: 250 mL / OUT: 1925 mL / NET: -1675 mL        Physical Exam:  General: NAD  CV: RR, S1, S2, no M/R/G  Lungs: CTA b/l, good air flow b/l   Abdomen: Soft, mildly-tender to palpation diffusely, softly distended, normoactive bowel sounds  Incision: Laparoscopic incision sites CDI  : minimal bleeding on pad  Ext: No pain or swelling       Labs:                        9.8    10.17 )-----------( 181      ( 2019 17:56 )             30.5   baso x      eos x      imm gran x      lymph x      mono x      poly x                            10.2   6.99  )-----------( 184      ( 2019 07:21 )             30.3   baso x      eos x      imm gran x      lymph x      mono x      poly x                            11.7   7.29  )-----------( 242      ( 2019 10:06 )             33.9   baso x      eos x      imm gran x      lymph x      mono x      poly x                            10.7   7.35  )-----------( 227      ( 15 Nov 2019 15:49 )             32.3   baso 0.3    eos 0.3    imm gran 0.8    lymph 26.0   mono 4.8    poly 67.8       MEDICATIONS  (STANDING):  acetaminophen   Tablet .. 975 milliGRAM(s) Oral every 6 hours  heparin  Injectable 5000 Unit(s) SubCutaneous every 12 hours  ibuprofen  Tablet. 400 milliGRAM(s) Oral every 6 hours  lactated ringers. 1000 milliLiter(s) (125 mL/Hr) IV Continuous <Continuous>    MEDICATIONS  (PRN):  oxyCODONE    IR 5 milliGRAM(s) Oral every 4 hours PRN Severe Pain (7 - 10)  simethicone 80 milliGRAM(s) Chew every 4 hours PRN Gas      A/P: 29y POD#1 HD#4 a/w  scar pregnancy @10wk GA s/p unsucessful UAE () and diagnostic laparoscopy, DVC, Supracervical Hysterectomy, BS, cystoscopy and oversewing of bladder. Patient is stable and doing well postoperatively.      Neuro: PO pain meds; Motrin, tylenol, Oxycodone  CV: Hemodynamically stable  Pulm: Saturating well on room air, encourage oob/amb  GI: Continue regular diet  : UOP adequate, d/c lujan later today  Heme: c/w HSQ and SCDs for DVT ppx  FEN: LR@125.  replete electrolytes prn   ID: Afebrile  Endo: No active issues   Dispo: Continue routine post-op care    Steve Wills PGY1 R1 FRANCOIS Progress Note    POD#1   HD#4    Patient seen and examined at bedside.  No acute events overnight. No acute complaints.  Pain well controlled.  Patient is ambulating and tolerating a regular diet. Patient is passing flatus.    Mckoy is still in place and to be removed later this morning.  Denies CP, SOB, N/V, fevers, and chills.    Vital Signs Last 24 Hours  T(C): 37.6 (11-18-19 @ 05:35), Max: 37.6 (11-18-19 @ 05:35)  HR: 93 (11-18-19 @ 05:35) (75 - 95)  BP: 105/66 (11-18-19 @ 05:35) (100/63 - 116/66)  RR: 18 (11-18-19 @ 05:35) (15 - 18)  SpO2: 97% (11-18-19 @ 05:35) (97% - 100%)    I&O's Summary    16 Nov 2019 07:01  -  17 Nov 2019 07:00  --------------------------------------------------------  IN: 250 mL / OUT: 950 mL / NET: -700 mL    17 Nov 2019 07:01  -  18 Nov 2019 06:48  --------------------------------------------------------  IN: 250 mL / OUT: 1925 mL / NET: -1675 mL        Physical Exam:  General: NAD  CV: RR, S1, S2, no M/R/G  Lungs: CTA b/l, good air flow b/l   Abdomen: Soft, mildly-tender to palpation diffusely, softly distended, normoactive bowel sounds  Incision: Laparoscopic incision sites CDI  : minimal bleeding on pad  Ext: No pain or swelling       Labs:                        9.8    10.17 )-----------( 181      ( 17 Nov 2019 17:56 )             30.5   baso x      eos x      imm gran x      lymph x      mono x      poly x                            10.2   6.99  )-----------( 184      ( 17 Nov 2019 07:21 )             30.3   baso x      eos x      imm gran x      lymph x      mono x      poly x                            11.7   7.29  )-----------( 242      ( 16 Nov 2019 10:06 )             33.9   baso x      eos x      imm gran x      lymph x      mono x      poly x                            10.7   7.35  )-----------( 227      ( 15 Nov 2019 15:49 )             32.3   baso 0.3    eos 0.3    imm gran 0.8    lymph 26.0   mono 4.8    poly 67.8       MEDICATIONS  (STANDING):  acetaminophen   Tablet .. 975 milliGRAM(s) Oral every 6 hours  heparin  Injectable 5000 Unit(s) SubCutaneous every 12 hours  ibuprofen  Tablet. 400 milliGRAM(s) Oral every 6 hours  lactated ringers. 1000 milliLiter(s) (125 mL/Hr) IV Continuous <Continuous>    MEDICATIONS  (PRN):  oxyCODONE    IR 5 milliGRAM(s) Oral every 4 hours PRN Severe Pain (7 - 10)  simethicone 80 milliGRAM(s) Chew every 4 hours PRN Gas

## 2019-11-18 NOTE — DISCHARGE NOTE PROVIDER - PROVIDER TOKENS
FREE:[LAST:[[facility]],PHONE:[phone]],FAX:[(   )    -],FOLLOWUP:[2 weeks]] FREE:[LAST:[[facility]],PHONE:[phone]],FAX:[(   )    -],FOLLOWUP:[2 weeks]],PROVIDER:[TOKEN:[[id number]]]

## 2019-11-18 NOTE — PROGRESS NOTE ADULT - ASSESSMENT
A/P: 29y POD#1 HD#4 a/w  scar pregnancy @10wk GA s/p unsucessful UAE () and diagnostic laparoscopy, DVC, Supracervical Hysterectomy, BS, cystoscopy and oversewing of bladder. Patient is stable and doing well postoperatively. A/P: 29y POD#1 HD#4 a/w  scar pregnancy @10wk GA s/p UAE () followed by diagnostic laparoscopy, DVC, Supracervical Hysterectomy, BS, cystoscopy and oversewing of bladder. Patient is stable and doing well postoperatively.

## 2019-11-18 NOTE — DISCHARGE NOTE PROVIDER - NSDCCPCAREPLAN_GEN_ALL_CORE_FT
PRINCIPAL DISCHARGE DIAGNOSIS  Diagnosis: Other ectopic pregnancy with intrauterine pregnancy  Assessment and Plan of Treatment: - Status post Supracervical Hysterectomy, bilateral salpingectomy.  - Follow up in 2 weeks for your post-operative check.

## 2019-11-18 NOTE — DISCHARGE NOTE PROVIDER - NSDCCPTREATMENT_GEN_ALL_CORE_FT
PRINCIPAL PROCEDURE  Procedure: Bilateral salpingectomy  Findings and Treatment: With Supracervical hysterectomy and dilation and curettage of uterus.

## 2019-11-18 NOTE — DISCHARGE NOTE PROVIDER - CARE PROVIDERS DIRECT ADDRESSES
,DirectAddress_Unknown ,DirectAddress_Unknown,[email or web address]

## 2019-11-19 ENCOUNTER — TRANSCRIPTION ENCOUNTER (OUTPATIENT)
Age: 29
End: 2019-11-19

## 2019-11-19 VITALS
HEART RATE: 77 BPM | SYSTOLIC BLOOD PRESSURE: 102 MMHG | OXYGEN SATURATION: 99 % | RESPIRATION RATE: 16 BRPM | TEMPERATURE: 98 F | DIASTOLIC BLOOD PRESSURE: 64 MMHG

## 2019-11-19 LAB
HCT VFR BLD CALC: 23.9 % — LOW (ref 34.5–45)
HGB BLD-MCNC: 7.9 G/DL — LOW (ref 11.5–15.5)
MCHC RBC-ENTMCNC: 29 PG — SIGNIFICANT CHANGE UP (ref 27–34)
MCHC RBC-ENTMCNC: 33.1 GM/DL — SIGNIFICANT CHANGE UP (ref 32–36)
MCV RBC AUTO: 87.9 FL — SIGNIFICANT CHANGE UP (ref 80–100)
NRBC # BLD: 0 /100 WBCS — SIGNIFICANT CHANGE UP (ref 0–0)
PLATELET # BLD AUTO: 173 K/UL — SIGNIFICANT CHANGE UP (ref 150–400)
RBC # BLD: 2.72 M/UL — LOW (ref 3.8–5.2)
RBC # FLD: 14.7 % — HIGH (ref 10.3–14.5)
WBC # BLD: 7.83 K/UL — SIGNIFICANT CHANGE UP (ref 3.8–10.5)
WBC # FLD AUTO: 7.83 K/UL — SIGNIFICANT CHANGE UP (ref 3.8–10.5)

## 2019-11-19 PROCEDURE — 85730 THROMBOPLASTIN TIME PARTIAL: CPT

## 2019-11-19 PROCEDURE — 80048 BASIC METABOLIC PNL TOTAL CA: CPT

## 2019-11-19 PROCEDURE — 99285 EMERGENCY DEPT VISIT HI MDM: CPT

## 2019-11-19 PROCEDURE — 88305 TISSUE EXAM BY PATHOLOGIST: CPT

## 2019-11-19 PROCEDURE — 84702 CHORIONIC GONADOTROPIN TEST: CPT

## 2019-11-19 PROCEDURE — C1769: CPT

## 2019-11-19 PROCEDURE — 88302 TISSUE EXAM BY PATHOLOGIST: CPT

## 2019-11-19 PROCEDURE — C1765: CPT

## 2019-11-19 PROCEDURE — 74182 MRI ABDOMEN W/CONTRAST: CPT

## 2019-11-19 PROCEDURE — 76817 TRANSVAGINAL US OBSTETRIC: CPT

## 2019-11-19 PROCEDURE — 76937 US GUIDE VASCULAR ACCESS: CPT

## 2019-11-19 PROCEDURE — 85027 COMPLETE CBC AUTOMATED: CPT

## 2019-11-19 PROCEDURE — 37242 VASC EMBOLIZE/OCCLUDE ARTERY: CPT

## 2019-11-19 PROCEDURE — C1887: CPT

## 2019-11-19 PROCEDURE — 72196 MRI PELVIS W/DYE: CPT

## 2019-11-19 PROCEDURE — C1889: CPT

## 2019-11-19 PROCEDURE — 86900 BLOOD TYPING SEROLOGIC ABO: CPT

## 2019-11-19 PROCEDURE — 85610 PROTHROMBIN TIME: CPT

## 2019-11-19 PROCEDURE — 86901 BLOOD TYPING SEROLOGIC RH(D): CPT

## 2019-11-19 PROCEDURE — 86850 RBC ANTIBODY SCREEN: CPT

## 2019-11-19 PROCEDURE — C1894: CPT

## 2019-11-19 PROCEDURE — 36247 INS CATH ABD/L-EXT ART 3RD: CPT

## 2019-11-19 PROCEDURE — 88307 TISSUE EXAM BY PATHOLOGIST: CPT

## 2019-11-19 PROCEDURE — C1760: CPT

## 2019-11-19 RX ADMIN — SIMETHICONE 80 MILLIGRAM(S): 80 TABLET, CHEWABLE ORAL at 11:55

## 2019-11-19 RX ADMIN — Medication 975 MILLIGRAM(S): at 06:26

## 2019-11-19 RX ADMIN — Medication 600 MILLIGRAM(S): at 06:26

## 2019-11-19 RX ADMIN — Medication 600 MILLIGRAM(S): at 11:56

## 2019-11-19 RX ADMIN — Medication 600 MILLIGRAM(S): at 01:02

## 2019-11-19 RX ADMIN — Medication 600 MILLIGRAM(S): at 00:16

## 2019-11-19 RX ADMIN — Medication 975 MILLIGRAM(S): at 00:16

## 2019-11-19 RX ADMIN — Medication 975 MILLIGRAM(S): at 01:02

## 2019-11-19 RX ADMIN — Medication 975 MILLIGRAM(S): at 12:25

## 2019-11-19 RX ADMIN — HEPARIN SODIUM 5000 UNIT(S): 5000 INJECTION INTRAVENOUS; SUBCUTANEOUS at 06:26

## 2019-11-19 RX ADMIN — Medication 975 MILLIGRAM(S): at 11:55

## 2019-11-19 RX ADMIN — Medication 600 MILLIGRAM(S): at 12:25

## 2019-11-19 NOTE — PROGRESS NOTE ADULT - SUBJECTIVE AND OBJECTIVE BOX
R1 FRANCOIS Progress Note POD#2    HD#5    Patient seen and examined at bedside, no acute overnight events. No acute complaints, pain well controlled. Patient is ambulating, passing flatus, voiding spontaneously, and tolerating regular diet. Denies CP, SOB, N/V, fevers, and chills.    Vital Signs Last 24 Hours  T(C): 37.2 (11-19-19 @ 05:38), Max: 37.2 (11-19-19 @ 05:38)  HR: 96 (11-19-19 @ 05:38) (81 - 96)  BP: 106/68 (11-19-19 @ 05:38) (94/61 - 116/75)  RR: 18 (11-19-19 @ 05:38) (18 - 18)  SpO2: 99% (11-19-19 @ 05:38) (97% - 99%)    I&O's Detail    17 Nov 2019 07:01  -  18 Nov 2019 07:00  --------------------------------------------------------  IN:    lactated ringers.: 250 mL  Total IN: 250 mL    OUT:    Indwelling Catheter - Urethral: 1925 mL  Total OUT: 1925 mL    Total NET: -1675 mL      18 Nov 2019 07:01  -  19 Nov 2019 06:11  --------------------------------------------------------  IN:    lactated ringers.: 1500 mL  Total IN: 1500 mL    OUT:    Indwelling Catheter - Urethral: 1300 mL    Voided: 950 mL  Total OUT: 2250 mL    Total NET: -750 mL          Physical Exam:  General: NAD  CV: NR, RR, S1, S2, no M/R/G  Lungs: CTA-B  Abdomen: Soft, non-tender, non-distended, +BS  Incision: Laprosc CDI  Ext: No pain or swelling    Labs:             8.4<L>  7.17  )-----------( 186      ( 11-18 @ 14:05 )             24.9<L>               7.9<L>  6.91  )-----------( 177      ( 11-18 @ 10:32 )             23.4<L>               8.5<L>  7.20  )-----------( 184      ( 11-18 @ 07:33 )             25.0<L>               9.8<L>  10.17 )-----------( 181      ( 11-17 @ 17:56 )             30.5<L>               10.2<L>  6.99  )-----------( 184      ( 11-17 @ 07:21 )             30.3<L>        MEDICATIONS  (STANDING):  acetaminophen   Tablet .. 975 milliGRAM(s) Oral every 6 hours  heparin  Injectable 5000 Unit(s) SubCutaneous every 12 hours  ibuprofen  Tablet. 600 milliGRAM(s) Oral every 6 hours  lactated ringers. 1000 milliLiter(s) (125 mL/Hr) IV Continuous <Continuous>    MEDICATIONS  (PRN):  oxyCODONE    IR 5 milliGRAM(s) Oral every 4 hours PRN Severe Pain (7 - 10)  simethicone 80 milliGRAM(s) Chew every 4 hours PRN Gas

## 2019-11-19 NOTE — PROGRESS NOTE ADULT - ATTENDING COMMENTS
GYN Attending:  Pt seen and examined. I agree with above assessment and plan. MRI reviewed with MFM as well as surgical plan.. Pt  extensively counseled that pregnancy not viable and severe risk of hemorrhage and maternal morbidity and mortality. R/b/a of conservative vs sx management extensively discussed. pt reports 90% sure she doesn't want future fertility but understands risk of sterilization with hysterectomy. Will proceed with D+C, Dx LSC, poss uterine wedge resection poss TLH/BS, cystoscopy, poss xlap. All questions answered. Pt and  verbalized full understanding.     DOUGIE Cleveland MD
Patient seen at bedside, agree with above assessment, patient denies CP, SOB, N, V. Abdomen soft, NT, mildly distended, inc c/d/i with steris. AM CBC reviewed, will repeat at 10AM, to remove lujan catheter, likely discharge planning after voids and reassessment later in PM. All questions answered
Patient seen at bedside, no acute complaints at this time. Denies CP, SOB, N, V, Dizziness, palpitations, ambulating without difficultly, tolerating PO, abdomen soft, NT, ND, inc c/d/i discharged home in stable condition.

## 2019-11-19 NOTE — PROGRESS NOTE ADULT - ASSESSMENT
A/P: 29y POD#2 HD#5 a/w  scar pregnancy @10wk GA s/p unsuccessful UAE () and diagnostic laparoscopy, DVC, Supracervical Hysterectomy, BS, cystoscopy and oversewing of bladder. Patient is stable and doing well postoperatively. A/P: 29y POD#2 HD#5 a/w  scar pregnancy @10wk GA s/p UAE () followed by diagnostic laparoscopy, DVC, Supracervical Hysterectomy, BS, cystoscopy and oversewing of bladder. Patient is stable and doing well postoperatively.

## 2019-11-19 NOTE — PROGRESS NOTE ADULT - PROBLEM SELECTOR PLAN 1
- Added on to OR  - f/u AM CBC, BMP, coags  - MRI ordered for surgical planning, will try to obtain prior to OR  - NPO, LR@125  - DVT ppx: SCDs while in bed  - Mckoy in place  - T&S active  - 4u PRBCs on hold    DAYSI Vee PGY2
- f/u AM CBC, BMP, coags  - MRI ordered for surgical planning  - NPO overnight without episode of bleeding, consider regular diet this AM  - LR@125, SLIV   - DVT ppx: SCDs while in bed, encourage ambulation  - T&S active  - 4u PRBCs on hold  - IR aware of patient    DAYSI Vee PGY2
Neuro: PO pain meds; Motrin, tylenol, Oxycodone  CV: Hemodynamically stable  Pulm: Saturating well on room air, encourage oob/amb  GI: Continue regular diet  : Voiding Spontaneously  Heme: c/w HSQ and SCDs for DVT ppx  FEN: SLIV  ID: Afebrile  Endo: No active issues   Dispo: Discharge planning today    Steve Wills PGY1.
Neuro: PO pain meds; Motrin, tylenol, Oxycodone  CV: Hemodynamically stable  Pulm: Saturating well on room air, encourage oob/amb  GI: Continue regular diet  : UOP adequate, d/c lujan later today  Heme: c/w HSQ and SCDs for DVT ppx  FEN: LR@125.  replete electrolytes prn   ID: Afebrile  Endo: No active issues   Dispo: Continue routine post-op care    Steve Wills PGY1

## 2019-11-19 NOTE — DISCHARGE NOTE NURSING/CASE MANAGEMENT/SOCIAL WORK - PATIENT PORTAL LINK FT
You can access the FollowMyHealth Patient Portal offered by [facility] by registering at the following website: [email or web address]. By joining [facility]’s FollowMyHealth portal, you will also be able to view your health information using other applications (apps) compatible with our system.

## 2019-11-19 NOTE — DISCHARGE NOTE NURSING/CASE MANAGEMENT/SOCIAL WORK - NSDCPNINST_GEN_ALL_CORE
Keep follow up appointment with doctor as instructed and call doctor with any signs of infection, fever, chills, difficulty urinating or moving bowels, heavy vaginal bleeding or clots, strong vaginal odor, excessive pain, any chest discomfort, pain behind your legs, nausea or vomiting and with any questions or concerns. No heavy lifting or strenuous activity, no driving, and nothing per vagina- no douching, sex, tub bathing or use of tampoons until cleared by your doctor. Keep scope sites clean, dry and intact and no creams or lotions to them.

## 2019-11-19 NOTE — PROGRESS NOTE ADULT - REASON FOR ADMISSION
scar ectopic pregnancy

## 2019-11-25 ENCOUNTER — APPOINTMENT (OUTPATIENT)
Dept: OBGYN | Facility: CLINIC | Age: 29
End: 2019-11-25

## 2019-12-09 ENCOUNTER — OUTPATIENT (OUTPATIENT)
Dept: OUTPATIENT SERVICES | Facility: HOSPITAL | Age: 29
LOS: 1 days | End: 2019-12-09
Payer: MEDICAID

## 2019-12-09 ENCOUNTER — APPOINTMENT (OUTPATIENT)
Dept: OBGYN | Facility: CLINIC | Age: 29
End: 2019-12-09
Payer: MEDICAID

## 2019-12-09 VITALS
DIASTOLIC BLOOD PRESSURE: 72 MMHG | BODY MASS INDEX: 30.85 KG/M2 | SYSTOLIC BLOOD PRESSURE: 120 MMHG | WEIGHT: 185.38 LBS

## 2019-12-09 DIAGNOSIS — Z90.711 ACQUIRED ABSENCE OF UTERUS WITH REMAINING CERVICAL STUMP: ICD-10-CM

## 2019-12-09 DIAGNOSIS — N76.0 ACUTE VAGINITIS: ICD-10-CM

## 2019-12-09 DIAGNOSIS — O00.80 OTHER. ECTOPIC PREGNANCY WITHOUT INTRAUTERINE PREGNANCY: ICD-10-CM

## 2019-12-09 PROCEDURE — 81003 URINALYSIS AUTO W/O SCOPE: CPT

## 2019-12-09 PROCEDURE — G0463: CPT

## 2019-12-09 PROCEDURE — 99213 OFFICE O/P EST LOW 20 MIN: CPT | Mod: NC

## 2019-12-10 NOTE — HISTORY OF PRESENT ILLNESS
[1/10] : the patient reports pain that is 1/10 in severity [Clean/Dry/Intact] : clean, dry and intact [None] : no vaginal bleeding [Normal] : the vagina was normal [No Sign of Infection] : is showing no signs of infection [Doing Well] : is doing well [Fever] : no fever [Chills] : no chills [Nausea] : no nausea [Vaginal Bleeding] : no vaginal bleeding [Pelvic Pressure] : no pelvic pressure [Dysuria] : no dysuria [de-identified] : 29 y.o. , w/ (+)pregnancy presented to ED on 11/15 with heavy vaginal bleeding.  Official ultrasound performed showed ectopic pregnancy in uterine scar.  Pt admitted.  HD #1 uterine artery embolization performed.  HD # 2 diagnostic lap, Dilation and vacuum curretage, supracervical hysterectomy, b/l salpingectomy, oversewing of bladder.  Pt reports that she is feeling well - no pain.  [de-identified] : 29 y.o  s/p dilation and vacuum curretage, supracervical hysterectomy, b/l salpingectomy, oversewing of bladder on  d/t ectopic pregnancy in C/S.  [de-identified] : Rtn to normal activities in additional 2 weeks. Rtn for annual 10/2019. MAIN Lieberman PAC

## 2019-12-23 DIAGNOSIS — O00.80 OTHER ECTOPIC PREGNANCY WITHOUT INTRAUTERINE PREGNANCY: ICD-10-CM

## 2019-12-23 DIAGNOSIS — Z90.711 ACQUIRED ABSENCE OF UTERUS WITH REMAINING CERVICAL STUMP: ICD-10-CM

## 2021-03-16 NOTE — CONSULT NOTE ADULT - PROBLEM SELECTOR RECOMMENDATION 9
- Keflex 500mg bid x 7d  - pt given fever precautions, to return if fever, worsening abdominal pain, other acute concerns  - strongly advised to go to OB office for f/u within 48hrs    Patient seen and examined with Dr. Shahida Lozada, PGY2 yes

## 2021-07-29 NOTE — ED PROVIDER NOTE - CONSULTING PHYSICIAN
JOHANNA HERNANDEZ is a 32y/o  at 14w5d s/p hx indicated cerclage 3days ago with complaints of headache since then worsened upon standing, improved with lying and not relieved by tylenol. Pt denies any LOF, VB, or abdominal pain. She denies any fever/chills, n/v, SOB, HA, or RUQ pain.     course complicated by:  - Hx of  delivery: pLTCS for PPROM of twins (transverse/transverse presentation) in setting of known cervical shortening and cat 2 FHT. 1 fetal demise, 1 living child doing well today      OBHx:  -  - 2018: PPROM twins pLTCS (24+1), cervical insufficiency (pessary use), painless cervical dilation    - denies any hx of fibroids/cysts/STIs/abnormal pap  - Denies any hx of PPH, or blood transfusion    PMHx: depression, anxiety  Home Meds: PNV, folic acid, Vit D  SurgHx: pLTCS  PSHx:  -Lives w/  and son  - Denies Alcohol/Tobacco/Illicit drug use   Meds: PNV  All: No Known Allergies OBGYN

## 2021-10-21 NOTE — DISCHARGE NOTE NURSING/CASE MANAGEMENT/SOCIAL WORK - NSTRANSFERBELONGINGSDISPO_GEN_A_NUR
Currently on 10 units of Lantus and Januvia 50 mg recommended to increase the Lantus to 12 units at bedtime monitoring blood sugars goal of the blood sugars around 130 in the morning and less than 180 ,2  hours after meals, discussed to increase Lantus to 2 units every 2 to 3 days to achieve the goal, also to schedule appointment with pharmacist for insulin management  She will follow-up in December   with patient

## 2022-10-06 NOTE — PATIENT PROFILE OB - FALL HARM RISK TYPE OF ASSESSMENT
Urine micro consistent with infection.  Awaiting culture results.  Electronically signed by: Zeke Dubon DO 10/6/2022    Admission

## 2024-01-01 NOTE — ED PROVIDER NOTE - ATTESTATION, MLM
If your baby has any of the following, CALL YOUR PEDIATRICIAN OR RETURN TO THE HOSPITAL I have reviewed and confirmed nurses' notes for patient's medications, allergies, medical history, and surgical history.

## 2025-04-29 NOTE — PATIENT PROFILE ADULT. - --DESCRIBE
----- Message from Tonya Sun MD sent at 4/29/2025  1:12 PM CDT -----  Please let patient know MRI is normal.  ----- Message -----  From: Interface, Rad Results In  Sent: 4/18/2025   9:10 AM CDT  To: Tonya Sun MD     small amount of serosang drainage
